# Patient Record
Sex: MALE | Race: WHITE | Employment: FULL TIME | URBAN - METROPOLITAN AREA
[De-identification: names, ages, dates, MRNs, and addresses within clinical notes are randomized per-mention and may not be internally consistent; named-entity substitution may affect disease eponyms.]

---

## 2017-01-13 ENCOUNTER — OFFICE VISIT (OUTPATIENT)
Dept: ORTHOPEDIC SURGERY | Age: 62
End: 2017-01-13

## 2017-01-13 VITALS
WEIGHT: 260 LBS | BODY MASS INDEX: 37.22 KG/M2 | HEIGHT: 70 IN | DIASTOLIC BLOOD PRESSURE: 90 MMHG | SYSTOLIC BLOOD PRESSURE: 138 MMHG | HEART RATE: 68 BPM

## 2017-01-13 DIAGNOSIS — M21.061 ACQUIRED VALGUS DEFORMITY KNEE, RIGHT: ICD-10-CM

## 2017-01-13 DIAGNOSIS — M25.461 EFFUSION OF RIGHT KNEE: Primary | ICD-10-CM

## 2017-01-13 DIAGNOSIS — G89.29 CHRONIC PAIN OF RIGHT KNEE: ICD-10-CM

## 2017-01-13 DIAGNOSIS — M17.11 PRIMARY OSTEOARTHRITIS OF RIGHT KNEE: ICD-10-CM

## 2017-01-13 DIAGNOSIS — M17.0 PRIMARY OSTEOARTHRITIS OF BOTH KNEES: ICD-10-CM

## 2017-01-13 DIAGNOSIS — M25.561 CHRONIC PAIN OF RIGHT KNEE: ICD-10-CM

## 2017-01-13 PROCEDURE — 99213 OFFICE O/P EST LOW 20 MIN: CPT | Performed by: ORTHOPAEDIC SURGERY

## 2017-01-20 ENCOUNTER — HOSPITAL ENCOUNTER (OUTPATIENT)
Dept: PREADMISSION TESTING | Age: 62
Discharge: HOME | End: 2017-01-20
Attending: ORTHOPAEDIC SURGERY | Admitting: ORTHOPAEDIC SURGERY

## 2017-01-20 VITALS
HEART RATE: 62 BPM | RESPIRATION RATE: 18 BRPM | WEIGHT: 265 LBS | BODY MASS INDEX: 37.94 KG/M2 | HEIGHT: 70 IN | SYSTOLIC BLOOD PRESSURE: 136 MMHG | TEMPERATURE: 97 F | DIASTOLIC BLOOD PRESSURE: 88 MMHG | OXYGEN SATURATION: 99 %

## 2017-01-20 LAB
ABO/RH: NORMAL
ABO/RH: NORMAL
ANION GAP SERPL CALCULATED.3IONS-SCNC: 10 MMOL/L (ref 3–16)
ANTIBODY SCREEN: NORMAL
APTT: 33 SEC (ref 25.2–36.4)
BASOPHILS ABSOLUTE: 0 K/UL (ref 0–0.2)
BASOPHILS RELATIVE PERCENT: 0.5 %
BILIRUBIN URINE: NEGATIVE
BLOOD, URINE: NEGATIVE
BUN BLDV-MCNC: 23 MG/DL (ref 7–20)
CALCIUM SERPL-MCNC: 8.9 MG/DL (ref 8.3–10.6)
CHLORIDE BLD-SCNC: 101 MMOL/L (ref 99–110)
CLARITY: CLEAR
CO2: 27 MMOL/L (ref 21–32)
COLOR: YELLOW
CREAT SERPL-MCNC: 0.8 MG/DL (ref 0.8–1.3)
EOSINOPHILS ABSOLUTE: 0.2 K/UL (ref 0–0.6)
EOSINOPHILS RELATIVE PERCENT: 3.1 %
GFR AFRICAN AMERICAN: >60
GFR NON-AFRICAN AMERICAN: >60
GLUCOSE BLD-MCNC: 93 MG/DL (ref 70–99)
GLUCOSE URINE: NEGATIVE MG/DL
HCT VFR BLD CALC: 42.5 % (ref 40.5–52.5)
HEMOGLOBIN: 14.6 G/DL (ref 13.5–17.5)
INR BLD: 0.98 (ref 0.85–1.16)
KETONES, URINE: NEGATIVE MG/DL
LEUKOCYTE ESTERASE, URINE: NEGATIVE
LYMPHOCYTES ABSOLUTE: 1.6 K/UL (ref 1–5.1)
LYMPHOCYTES RELATIVE PERCENT: 27.5 %
MCH RBC QN AUTO: 30.2 PG (ref 26–34)
MCHC RBC AUTO-ENTMCNC: 34.2 G/DL (ref 31–36)
MCV RBC AUTO: 88.1 FL (ref 80–100)
MICROSCOPIC EXAMINATION: NORMAL
MONOCYTES ABSOLUTE: 0.5 K/UL (ref 0–1.3)
MONOCYTES RELATIVE PERCENT: 8.7 %
NEUTROPHILS ABSOLUTE: 3.6 K/UL (ref 1.7–7.7)
NEUTROPHILS RELATIVE PERCENT: 60.2 %
NITRITE, URINE: NEGATIVE
PDW BLD-RTO: 13.1 % (ref 12.4–15.4)
PH UA: 6
PLATELET # BLD: 209 K/UL (ref 135–450)
PMV BLD AUTO: 8.1 FL (ref 5–10.5)
POTASSIUM SERPL-SCNC: 4.3 MMOL/L (ref 3.5–5.1)
PROTEIN UA: NEGATIVE MG/DL
PROTHROMBIN TIME: 11.2 SEC (ref 9.8–13)
RBC # BLD: 4.83 M/UL (ref 4.2–5.9)
SODIUM BLD-SCNC: 138 MMOL/L (ref 136–145)
SPECIFIC GRAVITY UA: 1.01
URINE TYPE: NORMAL
UROBILINOGEN, URINE: 0.2 E.U./DL
WBC # BLD: 5.9 K/UL (ref 4–11)

## 2017-01-20 RX ORDER — LISINOPRIL 30 MG/1
30 TABLET ORAL DAILY
COMMUNITY

## 2017-01-20 RX ORDER — TRANEXAMIC ACID 650 1/1
1950 TABLET ORAL ONCE
Status: CANCELLED | OUTPATIENT
Start: 2017-01-20 | End: 2017-01-20

## 2017-01-20 RX ORDER — GABAPENTIN 300 MG/1
300 CAPSULE ORAL
Status: CANCELLED | OUTPATIENT
Start: 2017-01-20 | End: 2017-01-20

## 2017-01-21 LAB
MRSA SCREEN RT-PCR: NORMAL
URINE CULTURE, ROUTINE: NORMAL

## 2017-01-24 ENCOUNTER — TELEPHONE (OUTPATIENT)
Dept: ORTHOPEDIC SURGERY | Age: 62
End: 2017-01-24

## 2017-01-30 ENCOUNTER — OFFICE VISIT (OUTPATIENT)
Dept: ORTHOPEDIC SURGERY | Age: 62
End: 2017-01-30

## 2017-01-30 VITALS
BODY MASS INDEX: 36.51 KG/M2 | SYSTOLIC BLOOD PRESSURE: 124 MMHG | DIASTOLIC BLOOD PRESSURE: 78 MMHG | HEIGHT: 70 IN | HEART RATE: 102 BPM | WEIGHT: 255 LBS

## 2017-01-30 DIAGNOSIS — Z96.651 STATUS POST TOTAL RIGHT KNEE REPLACEMENT: Primary | ICD-10-CM

## 2017-01-30 PROCEDURE — 99024 POSTOP FOLLOW-UP VISIT: CPT | Performed by: ORTHOPAEDIC SURGERY

## 2017-01-30 RX ORDER — OXYCODONE HYDROCHLORIDE AND ACETAMINOPHEN 5; 325 MG/1; MG/1
1-2 TABLET ORAL 4 TIMES DAILY PRN
Qty: 60 TABLET | Refills: 0 | Status: SHIPPED | OUTPATIENT
Start: 2017-01-30 | End: 2017-03-27

## 2017-02-13 ENCOUNTER — TELEPHONE (OUTPATIENT)
Dept: ORTHOPEDIC SURGERY | Age: 62
End: 2017-02-13

## 2017-02-14 RX ORDER — HYDROCODONE BITARTRATE AND ACETAMINOPHEN 7.5; 325 MG/1; MG/1
1 TABLET ORAL 4 TIMES DAILY PRN
Qty: 60 TABLET | Refills: 0 | Status: SHIPPED | OUTPATIENT
Start: 2017-02-14 | End: 2017-03-27

## 2017-02-20 ENCOUNTER — OFFICE VISIT (OUTPATIENT)
Dept: ORTHOPEDIC SURGERY | Age: 62
End: 2017-02-20

## 2017-02-20 VITALS
DIASTOLIC BLOOD PRESSURE: 89 MMHG | HEART RATE: 70 BPM | BODY MASS INDEX: 36.51 KG/M2 | HEIGHT: 70 IN | WEIGHT: 255 LBS | SYSTOLIC BLOOD PRESSURE: 131 MMHG

## 2017-02-20 DIAGNOSIS — M17.11 PRIMARY OSTEOARTHRITIS OF RIGHT KNEE: Primary | ICD-10-CM

## 2017-02-20 DIAGNOSIS — Z96.651 STATUS POST TOTAL RIGHT KNEE REPLACEMENT: ICD-10-CM

## 2017-02-20 DIAGNOSIS — M21.061 ACQUIRED VALGUS DEFORMITY KNEE, RIGHT: ICD-10-CM

## 2017-02-20 DIAGNOSIS — M17.0 PRIMARY OSTEOARTHRITIS OF BOTH KNEES: Chronic | ICD-10-CM

## 2017-02-20 PROCEDURE — 99024 POSTOP FOLLOW-UP VISIT: CPT | Performed by: ORTHOPAEDIC SURGERY

## 2017-02-20 RX ORDER — OXYCODONE HYDROCHLORIDE AND ACETAMINOPHEN 5; 325 MG/1; MG/1
1 TABLET ORAL 4 TIMES DAILY PRN
Qty: 60 TABLET | Refills: 0 | Status: SHIPPED | OUTPATIENT
Start: 2017-02-20 | End: 2017-03-27

## 2017-02-20 RX ORDER — DICLOFENAC SODIUM 75 MG/1
75 TABLET, DELAYED RELEASE ORAL 2 TIMES DAILY
Qty: 60 TABLET | Refills: 3 | Status: SHIPPED | OUTPATIENT
Start: 2017-02-20 | End: 2017-07-03

## 2017-03-27 ENCOUNTER — OFFICE VISIT (OUTPATIENT)
Dept: ORTHOPEDIC SURGERY | Age: 62
End: 2017-03-27

## 2017-03-27 VITALS
HEIGHT: 70 IN | SYSTOLIC BLOOD PRESSURE: 158 MMHG | HEART RATE: 56 BPM | DIASTOLIC BLOOD PRESSURE: 102 MMHG | WEIGHT: 255 LBS | BODY MASS INDEX: 36.51 KG/M2

## 2017-03-27 DIAGNOSIS — Z96.651 STATUS POST TOTAL RIGHT KNEE REPLACEMENT: Primary | ICD-10-CM

## 2017-03-27 DIAGNOSIS — M25.461 EFFUSION OF RIGHT KNEE: ICD-10-CM

## 2017-03-27 PROCEDURE — 99024 POSTOP FOLLOW-UP VISIT: CPT | Performed by: ORTHOPAEDIC SURGERY

## 2017-03-27 PROCEDURE — 73564 X-RAY EXAM KNEE 4 OR MORE: CPT | Performed by: ORTHOPAEDIC SURGERY

## 2017-03-27 RX ORDER — OXYCODONE HYDROCHLORIDE AND ACETAMINOPHEN 5; 325 MG/1; MG/1
1 TABLET ORAL 2 TIMES DAILY PRN
Qty: 60 TABLET | Refills: 0 | Status: SHIPPED | OUTPATIENT
Start: 2017-03-27 | End: 2017-07-03

## 2017-03-27 RX ORDER — IBUPROFEN 800 MG/1
800 TABLET ORAL 3 TIMES DAILY PRN
Qty: 90 TABLET | Refills: 3 | Status: ON HOLD | OUTPATIENT
Start: 2017-03-27 | End: 2017-11-08

## 2017-05-17 ENCOUNTER — OFFICE VISIT (OUTPATIENT)
Dept: ORTHOPEDIC SURGERY | Age: 62
End: 2017-05-17

## 2017-05-17 VITALS
BODY MASS INDEX: 36.51 KG/M2 | WEIGHT: 255 LBS | DIASTOLIC BLOOD PRESSURE: 80 MMHG | HEART RATE: 64 BPM | SYSTOLIC BLOOD PRESSURE: 118 MMHG | HEIGHT: 70 IN

## 2017-05-17 DIAGNOSIS — M21.162 ACQUIRED VARUS DEFORMITY KNEE, LEFT: ICD-10-CM

## 2017-05-17 DIAGNOSIS — Z96.651 STATUS POST TOTAL RIGHT KNEE REPLACEMENT: ICD-10-CM

## 2017-05-17 PROBLEM — M21.169 ACQUIRED VARUS DEFORMITY KNEE: Status: ACTIVE | Noted: 2017-05-17

## 2017-05-17 PROCEDURE — 99213 OFFICE O/P EST LOW 20 MIN: CPT | Performed by: ORTHOPAEDIC SURGERY

## 2017-07-03 ENCOUNTER — OFFICE VISIT (OUTPATIENT)
Dept: ORTHOPEDIC SURGERY | Age: 62
End: 2017-07-03

## 2017-07-03 VITALS
HEART RATE: 70 BPM | SYSTOLIC BLOOD PRESSURE: 143 MMHG | BODY MASS INDEX: 36.51 KG/M2 | WEIGHT: 255 LBS | HEIGHT: 70 IN | DIASTOLIC BLOOD PRESSURE: 100 MMHG

## 2017-07-03 DIAGNOSIS — Z96.651 STATUS POST TOTAL RIGHT KNEE REPLACEMENT: Primary | ICD-10-CM

## 2017-07-03 DIAGNOSIS — M17.12 PRIMARY OSTEOARTHRITIS OF LEFT KNEE: ICD-10-CM

## 2017-07-03 DIAGNOSIS — M21.062 ACQUIRED VALGUS DEFORMITY KNEE, LEFT: ICD-10-CM

## 2017-07-03 PROBLEM — M21.069 ACQUIRED VALGUS DEFORMITY KNEE: Status: ACTIVE | Noted: 2017-07-03

## 2017-07-03 PROCEDURE — 99213 OFFICE O/P EST LOW 20 MIN: CPT | Performed by: ORTHOPAEDIC SURGERY

## 2017-07-03 PROCEDURE — 20610 DRAIN/INJ JOINT/BURSA W/O US: CPT | Performed by: ORTHOPAEDIC SURGERY

## 2017-07-28 ENCOUNTER — HOSPITAL ENCOUNTER (OUTPATIENT)
Dept: GENERAL RADIOLOGY | Facility: MEDICAL CENTER | Age: 62
Discharge: HOME | End: 2017-07-28
Attending: ORTHOPAEDIC SURGERY | Admitting: ORTHOPAEDIC SURGERY

## 2017-07-28 ENCOUNTER — TELEPHONE (OUTPATIENT)
Dept: ORTHOPEDIC SURGERY | Age: 62
End: 2017-07-28

## 2017-07-28 ENCOUNTER — OFFICE VISIT (OUTPATIENT)
Dept: ORTHOPEDIC SURGERY | Age: 62
End: 2017-07-28

## 2017-07-28 ENCOUNTER — SURG/PROC ORDERS (OUTPATIENT)
Dept: ORTHOPEDIC SURGERY | Age: 62
End: 2017-07-28

## 2017-07-28 VITALS
WEIGHT: 250 LBS | BODY MASS INDEX: 35 KG/M2 | SYSTOLIC BLOOD PRESSURE: 139 MMHG | HEIGHT: 71 IN | DIASTOLIC BLOOD PRESSURE: 92 MMHG | HEART RATE: 56 BPM

## 2017-07-28 DIAGNOSIS — Z96.651 STATUS POST TOTAL RIGHT KNEE REPLACEMENT: ICD-10-CM

## 2017-07-28 DIAGNOSIS — Z98.890 S/P LEFT KNEE ARTHROSCOPY: ICD-10-CM

## 2017-07-28 DIAGNOSIS — G89.29 CHRONIC PAIN OF LEFT KNEE: ICD-10-CM

## 2017-07-28 DIAGNOSIS — M25.562 CHRONIC PAIN OF LEFT KNEE: ICD-10-CM

## 2017-07-28 DIAGNOSIS — G89.29 CHRONIC PAIN OF LEFT KNEE: Primary | ICD-10-CM

## 2017-07-28 DIAGNOSIS — M17.12 PRIMARY OSTEOARTHRITIS OF LEFT KNEE: ICD-10-CM

## 2017-07-28 DIAGNOSIS — M25.562 CHRONIC PAIN OF LEFT KNEE: Primary | ICD-10-CM

## 2017-07-28 PROCEDURE — 73562 X-RAY EXAM OF KNEE 3: CPT | Performed by: ORTHOPAEDIC SURGERY

## 2017-07-28 PROCEDURE — 99214 OFFICE O/P EST MOD 30 MIN: CPT | Performed by: ORTHOPAEDIC SURGERY

## 2017-10-05 ENCOUNTER — TELEPHONE (OUTPATIENT)
Dept: ORTHOPEDIC SURGERY | Age: 62
End: 2017-10-05

## 2017-10-30 ENCOUNTER — HOSPITAL ENCOUNTER (OUTPATIENT)
Dept: PREADMISSION TESTING | Age: 62
Discharge: HOME | End: 2017-10-30
Attending: ORTHOPAEDIC SURGERY | Admitting: ORTHOPAEDIC SURGERY

## 2017-10-30 ENCOUNTER — TELEPHONE (OUTPATIENT)
Dept: ORTHOPEDIC SURGERY | Age: 62
End: 2017-10-30

## 2017-10-30 VITALS
SYSTOLIC BLOOD PRESSURE: 131 MMHG | BODY MASS INDEX: 37.37 KG/M2 | OXYGEN SATURATION: 98 % | DIASTOLIC BLOOD PRESSURE: 82 MMHG | HEIGHT: 70 IN | TEMPERATURE: 96.6 F | RESPIRATION RATE: 14 BRPM | HEART RATE: 81 BPM | WEIGHT: 261 LBS

## 2017-10-30 LAB
A/G RATIO: 1.7 (ref 1.1–2.2)
ABO/RH: NORMAL
ALBUMIN SERPL-MCNC: 4.3 G/DL (ref 3.4–5)
ALP BLD-CCNC: 81 U/L (ref 40–129)
ALT SERPL-CCNC: 24 U/L (ref 10–40)
ANION GAP SERPL CALCULATED.3IONS-SCNC: 10 MMOL/L (ref 3–16)
ANTIBODY SCREEN: NORMAL
APTT: 32 SEC (ref 24.1–34.9)
AST SERPL-CCNC: 24 U/L (ref 15–37)
BASOPHILS ABSOLUTE: 0 K/UL (ref 0–0.2)
BASOPHILS RELATIVE PERCENT: 0.6 %
BILIRUB SERPL-MCNC: 1.3 MG/DL (ref 0–1)
BILIRUBIN URINE: NEGATIVE
BLOOD, URINE: NEGATIVE
BUN BLDV-MCNC: 20 MG/DL (ref 7–20)
CALCIUM SERPL-MCNC: 9.1 MG/DL (ref 8.3–10.6)
CHLORIDE BLD-SCNC: 104 MMOL/L (ref 99–110)
CLARITY: CLEAR
CO2: 26 MMOL/L (ref 21–32)
COLOR: YELLOW
CREAT SERPL-MCNC: 0.8 MG/DL (ref 0.8–1.3)
EKG ATRIAL RATE: 57 BPM
EKG DIAGNOSIS: NORMAL
EKG P AXIS: 57 DEGREES
EKG P-R INTERVAL: 158 MS
EKG Q-T INTERVAL: 428 MS
EKG QRS DURATION: 102 MS
EKG QTC CALCULATION (BAZETT): 416 MS
EKG R AXIS: 0 DEGREES
EKG T AXIS: 16 DEGREES
EKG VENTRICULAR RATE: 57 BPM
EOSINOPHILS ABSOLUTE: 0.1 K/UL (ref 0–0.6)
EOSINOPHILS RELATIVE PERCENT: 2 %
GFR AFRICAN AMERICAN: >60
GFR NON-AFRICAN AMERICAN: >60
GLOBULIN: 2.6 G/DL
GLUCOSE BLD-MCNC: 103 MG/DL (ref 70–99)
GLUCOSE URINE: NEGATIVE MG/DL
HCT VFR BLD CALC: 43.3 % (ref 40.5–52.5)
HEMOGLOBIN: 15 G/DL (ref 13.5–17.5)
INR BLD: 1.02 (ref 0.85–1.15)
KETONES, URINE: NEGATIVE MG/DL
LEUKOCYTE ESTERASE, URINE: NEGATIVE
LYMPHOCYTES ABSOLUTE: 1.7 K/UL (ref 1–5.1)
LYMPHOCYTES RELATIVE PERCENT: 25.2 %
MCH RBC QN AUTO: 30.5 PG (ref 26–34)
MCHC RBC AUTO-ENTMCNC: 34.6 G/DL (ref 31–36)
MCV RBC AUTO: 88.3 FL (ref 80–100)
MICROSCOPIC EXAMINATION: NORMAL
MONOCYTES ABSOLUTE: 0.6 K/UL (ref 0–1.3)
MONOCYTES RELATIVE PERCENT: 8.3 %
NEUTROPHILS ABSOLUTE: 4.3 K/UL (ref 1.7–7.7)
NEUTROPHILS RELATIVE PERCENT: 63.9 %
NITRITE, URINE: NEGATIVE
PDW BLD-RTO: 13.3 % (ref 12.4–15.4)
PH UA: 6.5
PLATELET # BLD: 217 K/UL (ref 135–450)
PMV BLD AUTO: 8.4 FL (ref 5–10.5)
POTASSIUM SERPL-SCNC: 4.3 MMOL/L (ref 3.5–5.1)
PROTEIN UA: NEGATIVE MG/DL
PROTHROMBIN TIME: 11.5 SEC (ref 9.6–13)
RBC # BLD: 4.91 M/UL (ref 4.2–5.9)
SODIUM BLD-SCNC: 140 MMOL/L (ref 136–145)
SPECIFIC GRAVITY UA: 1.02
TOTAL PROTEIN: 6.9 G/DL (ref 6.4–8.2)
URINE TYPE: NORMAL
UROBILINOGEN, URINE: 1 E.U./DL
WBC # BLD: 6.7 K/UL (ref 4–11)

## 2017-10-30 PROCEDURE — 93010 ELECTROCARDIOGRAM REPORT: CPT | Performed by: INTERNAL MEDICINE

## 2017-10-30 NOTE — PRE-PROCEDURE INSTRUCTIONS
[facility]                          Date of Procedure 11/8 Time of Procedure  0830    PRIOR TO PROCEDURE DATE:  1. Please follow any guidelines/instructions prior to your procedure as advised by your surgeon. 2. Arrange for someone to drive you home and be with you for the first 24 hours after discharge for your safety after your procedure for which you received sedation. Ensure it is someone we can share information with regarding your discharge. 3. You must contact your surgeon for instructions IF:   You are taking any blood thinners, aspirin, anti-inflammatory or vitamin E.   There is a change in your physical condition such as a cold, fever, rash, cuts, sores or any other infection, especially near your surgical site. 4. Do not drink alcohol the day before or day of your procedure. 5. A Pre-op History and Physical for surgery MUST be completed by your Physician or Urgent Care within 30 days of your procedure date. Please bring a copy with you on the day of your procedure and along with any other testing performed. THE DAY OF YOUR PROCEDURE:  1. Follow instructions for ARRIVAL TIME as DIRECTED BY YOUR SURGEON. If your surgeon does not give you a specific arrival time, please arrive at  0630.    2. Enter the MAIN entrance from [address] [place] [address] and follow the signs to the free [employer] or [employer] parking (offered free of charge 6am-5pm). 3. Enter the Main Entrance of the hospital (do NOT enter from the lower level of the parking garage). Upon entrance, check in with the  at the main desk on your left. If no one is available at the desk, proceed into the [facility] Waiting Room and go through the door directly into the [facility]. There is a Check-in desk ACROSS from Room 5 (marked with a sign hanging from the ceiling).  The phone number for the surgery center is [phone].    4. DO NOT EAT OR DRINK ANYTHING AFTER MIDNIGHT anesthesia. Should any of these symptoms become severe, or should you notice any signs of infection, you should call your surgeon. 5. Narcotic pain medications can cause significant constipation. You may want to add a stool softener to your postoperative medication schedule or speak to your surgeon on how best to manage this SIDE EFFECT. SPECIAL INSTRUCTIONS     Thank you for allowing us to care for you. We strive to exceed your expectations in the overall delivery of care and service provided to you and your family. If you need to contact us for any reason, please call us at [phone]    Instructions reviewed and copy given to patient during preadmission testing visit. Vernell Corley. 10/30/2017 .9:16 AM      ADDITIONAL EDUCATIONAL INFORMATION REVIEWED / PROVIDED TO YOU AND YOUR FAMILY:  Yes Taking Control of Your Pain   Yes FAQs about Surgical Site Infections    No Your Guide to Hip Replacement Surgery. PLEASE BRING THIS BOOKLET BACK ON THE DAY OF YOUR SURGERY. Yes Your Guide to Knee Replacement Surgery. PLEASE BRING THIS BOOKLET BACK ON THE DAY OF YOUR SURGERY. No Your Guide to Shoulder Replacement Surgery. PLEASE BRING THIS BOOKLET BACK ON THE DAY OF YOUR SURGERY.     Yes Hibiclens® Bathing Instructions   Yes Incentive Spirometer given to patient- PLEASE BRING THIS SPIROMETER BACK WITH YOU ON THE DAY OF YOUR SURGERY  No CMS Comprehensive Care for Joint Replacement Model Notification Letter  No Other

## 2017-10-31 LAB
MRSA SCREEN RT-PCR: NORMAL
URINE CULTURE, ROUTINE: NORMAL

## 2017-11-08 PROBLEM — Z96.652 S/P TKR (TOTAL KNEE REPLACEMENT) USING CEMENT, LEFT: Status: ACTIVE | Noted: 2017-11-08

## 2017-11-08 PROBLEM — I10 HYPERTENSION: Status: ACTIVE | Noted: 2017-11-08

## 2017-11-13 ENCOUNTER — TELEPHONE (OUTPATIENT)
Dept: ORTHOPEDIC SURGERY | Age: 62
End: 2017-11-13

## 2017-11-13 RX ORDER — OXYCODONE HYDROCHLORIDE AND ACETAMINOPHEN 5; 325 MG/1; MG/1
1 TABLET ORAL EVERY 4 HOURS PRN
Qty: 30 TABLET | Refills: 0 | Status: SHIPPED | OUTPATIENT
Start: 2017-11-13 | End: 2017-11-20 | Stop reason: SDUPTHER

## 2017-11-13 NOTE — TELEPHONE ENCOUNTER
I spoke with the patient letting him know that the script is ready to be picked up at the [address] [place] [address] location. He stated that his wife will probably pick it up tomorrow.

## 2017-11-20 ENCOUNTER — OFFICE VISIT (OUTPATIENT)
Dept: ORTHOPEDIC SURGERY | Age: 62
End: 2017-11-20

## 2017-11-20 ENCOUNTER — HOSPITAL ENCOUNTER (OUTPATIENT)
Dept: OTHER | Age: 62
Discharge: HOME | End: 2017-11-20
Attending: ORTHOPAEDIC SURGERY | Admitting: ORTHOPAEDIC SURGERY

## 2017-11-20 VITALS
DIASTOLIC BLOOD PRESSURE: 81 MMHG | BODY MASS INDEX: 37.37 KG/M2 | SYSTOLIC BLOOD PRESSURE: 126 MMHG | HEART RATE: 75 BPM | HEIGHT: 70 IN | WEIGHT: 261.02 LBS

## 2017-11-20 DIAGNOSIS — M25.562 CHRONIC PAIN OF LEFT KNEE: ICD-10-CM

## 2017-11-20 DIAGNOSIS — G89.29 CHRONIC PAIN OF LEFT KNEE: ICD-10-CM

## 2017-11-20 DIAGNOSIS — Z96.652 S/P TKR (TOTAL KNEE REPLACEMENT) USING CEMENT, LEFT: Primary | ICD-10-CM

## 2017-11-20 PROBLEM — M17.12 PRIMARY OSTEOARTHRITIS OF LEFT KNEE: Status: RESOLVED | Noted: 2017-07-03 | Resolved: 2017-11-20

## 2017-11-20 PROCEDURE — 99024 POSTOP FOLLOW-UP VISIT: CPT | Performed by: PHYSICIAN ASSISTANT

## 2017-11-20 PROCEDURE — 73562 X-RAY EXAM OF KNEE 3: CPT | Performed by: PHYSICIAN ASSISTANT

## 2017-11-20 RX ORDER — OXYCODONE HYDROCHLORIDE AND ACETAMINOPHEN 5; 325 MG/1; MG/1
1 TABLET ORAL EVERY 4 HOURS PRN
Qty: 30 TABLET | Refills: 0 | Status: SHIPPED | OUTPATIENT
Start: 2017-11-20 | End: 2017-11-22 | Stop reason: SDUPTHER

## 2017-11-20 NOTE — PROGRESS NOTES
Assessment  Location of Pain: Knee  Location Modifiers: Left  Severity of Pain: 8  Quality of Pain: Sharp, Aching  Duration of Pain: A few hours  Frequency of Pain: Several times daily  Date Pain First Started:  (from sx)  Aggravating Factors: Bending, Stretching, Straightening, Other (Comment) (all)  Limiting Behavior: Yes  Relieving Factors: Ice, Rest  Result of Injury: No  Work-Related Injury: No  Are there other pain locations you wish to document?: No    Patient Active Problem List   Diagnosis    Status post total right knee replacement 1/24/2017    S/P left knee arthroscopy 40 years ago    Chronic pain of left knee    S/P TKR (total knee replacement) using cement, left on 11/8/2017    Hypertension     Past Medical History:   Diagnosis Date    Anesthesia     difficulty urinating after back surgery     Arthritis     Chronic back pain     relieved with surgery in 2015    Hypertension     Hypertension 11/8/2017    Pinched nerve     L2-L5    Retention of urine      Past Surgical History:   Procedure Laterality Date    BACK SURGERY      COLONOSCOPY      KNEE SURGERY  x 3    OTHER SURGICAL HISTORY Left 11/08/2017    LEFT TOTAL KNEE ARTHROPLASTY              SHOULDER SURGERY      TOTAL KNEE ARTHROPLASTY Right 01/24/2017    RIGHT TOTAL KNEE ARTHROPLASTY                Allergies:  Penicillins; Bee venom; and Sulfa antibiotics    Medications:  Prior to Visit Medications    Medication Sig Taking? Authorizing Provider   oxyCODONE-acetaminophen (PERCOCET) 5-325 MG per tablet Take 1 tablet by mouth every 4 hours as needed for Pain .  Earliest Fill Date: 11/20/17 Yes JUDI Elena   aspirin 325 MG EC tablet Take 1 tablet by mouth 2 times daily (with meals)  JUDI Elena   lisinopril (PRINIVIL;ZESTRIL) 30 MG tablet Take 30 mg by mouth daily  Historical Provider, MD   tamsulosin (FLOMAX) 0.4 MG capsule Take 0.4 mg by mouth daily 30 minutes after meal   Historical Provider, MD     Social History without abrasion or lacerations. [x] Incisions with no signs of infection (red, warmth, drainage) and healing well  [] Incisions well healed  [x] Ecchymosis:  [x] none  [] mild  [] moderate  [] severe  [x] Atrophy:  [x] none  [] mild  [] moderate  [] severe  [x] Effusion: [x] none  [] mild  [] moderate  [] severe  [x] Scar / Surgical incision(s): [] A-Scope Portals  [x] Open Surgical Incision(s)    Alignment:  [x] Normal  [] Varus [] Valgus    Range of Motion:  [] Normal Knee ROM         [] Deferred: acute injury/post-surgery/pain   [x] Limited ROM: Normal post-op  [x] secondary to normal post-op pain/limitations    Palpation:   [] No Tenderness  [x] Tenderness: Normal post-op [x] mild  [] moderate  [] severe [x] secondary to normal post-op pain/limitations  [x] Baker's Cyst:  [x] none  [] small  [] medium  [] large  [x] Kedar's Sign: [x] negative  [] positive    Motor Function:   [] No gross motor weakness  [x] Motor weakness: Normal post-op [x] mild  [] moderate  [] severe   [x] secondary to normal post-op pain/limitations    Neurologic:  [x] Sensation to light touch intact   [x] Coordination / proprioception intact    Circulation:  [x] The limb is warm and well perfused  [x] Capillary refill is intact. [x] Edema  [x] none  [] mild  [] moderate  [] severe  [x] Venous stasis changes  [x] none  [] mild  [] moderate  [] severe    Data Reviewed:     XRays:  (3 views: AP, lateral and patella views) of his left knee taken today 11/20/17 in the office and reviewed by me personally showed a satisfactory TKR with no evidence of acute fracture or loosening. [x]  A copy of the AP XRay was given to him and reviewed with him today. Assessment (Medical Decision Making): Aftercare following joint replacement surgery:   (ICD10 code: Z47.1)     [x]  Left TKR (Total knee replacement) (ICD10 code: L68.037)     Linda Salcedo is a 65y.o. year old male with the following diagnosis:    1.  S/P TKR (total knee replacement) using cement, left on 11/8/2017     2. Chronic pain of left knee  XR KNEE LEFT (3 VIEWS)       His overall course:       [x]  Responding adequately to ongoing treatment after surgery    []  Worsening despite postoperative treatment     []  Unchanged despite postoperative treatment     Plan (Medical Decision Making):       [x] Continue deep venous thrombosis prophylaxis:    HIRA Parker    Aspirin 325 mg PO BID x 6 weeks post-op. Once he has finished the aspirin he may restart his ibuprofen. [x] Continue physical therapy   [x] Continue pain control: Ice and Percocet   [x] Ice to knee. [x] The staples were removed today. Steri-strips were applied. [x] Gutierrez Carrier has been instructed to remove the steri-strips in two days then he can start to rub Vit E oil on the incision(s) once a day. Refills/scripts given today: Percocet                             [x]  OARRS ([place] Automated Rx Reporting System):                                      OARRS Report on Gutierrez Carranza was run: 7/28/17. Active Cumulative Morphine Equivalent of 0           Patient being given increased dosage/quantity of opioid pain medication in excess of OSMB guidelines which noted a 30 MED of opioids due to the fact that he has undergone major orthopaedic surgery as outlined in rule 4731-11-13. Dosages and further duration of the pain medication will be re-evaluated at his post op visit. Patient was educated on dosing expectations and limits of prescribing as a result of the new [facility] Board rules enacted August 31, 2017. Please also note that this is not the initial opioid prescription issued to this patient but a continuation of medication utilized during the hospital admission as noted in the medical record. OARRS report has also been utilized to screen for any abuse history or suspicious activity as outlined in [place].   All efforts have been taken to prevent abuse potential and misuse of opioid medications. Return to Clinic/Follow - Up:  Ady Ann was asked to make a follow-up appointment:    [x] 4 weeks    Ady Ann was instructed to call the office if his symptoms worsen or if new symptoms appear prior to the next scheduled visit. He is specifically instructed to contact the office between now & his scheduled appointment if he has concerns related to his condition. He is welcome to call for an appointment sooner if he has any additional concerns or questions. Patient Instructions     PATIENT INSTRUCTIONS  For Ady Ann  Office Visit with Kristin Nolasco PA-C on 11/20/2017    Activity & PT Instructions:   Knee immobilizer and HIRA hose to be used until instructed by MD/PT   Avoid painful activities   No sports until cleared by MD/PT   No driving if you are taking narcotic pain medications or muscle relaxers   Keep extremity elevated   PT or Home Exercise Program for 3 months post-op    Note: PT is usually twice a week for 3 months (will be extended if necessary)   \"Remember to: [employer] and Tremor Video" your knee as instructed by Dr Marylene Chalk    Weight Bearing:   Use a walker until instructed by MD/PT   Use crutches or a walker if your knee is painful   May be partial weightbearing on operative leg   Advance to full weightbearing as tolerated once cleared by MD/PT    Wound Instructions:   Once sutures/staples are removed, use Vit E oil on incisions once a day   If in the sun, protect incisions from sun by using:     Sunscreen 30-50 SPF, reapply regularly. Elevate operative limb above heart level   Ice to knee for 15  20 minutes 3 x day    (\"ICE IS YOUR FRIEND\": try using a bag of frozen peas or corn)      Call the office ([phone], Option 3) if:     The incision becomes red, swollen or develops drainage. You develop a fever greater than 101 degrees.     Follow-up Care:   I have asked Ady Ann to schedule a (intra-muscularly or intravenously) 1 hour before the procedure. If you ARE allergic to penicillin[de-identified] 600 milligrams of clindamycin (orally or intravenously) 1 hour before the procedure. PLEASE NOTE: Antibiotics may vary based on clinical situation and culture results. PATIENT REMINDER:   Carry a list of your medications and allergies with you at all times. Call your pharmacy and our office at least 1 week in advance to refill prescriptions. Narcotic medications will not be refilled after hours or on weekends. ATTENTION    As of October 2, 2014, the [facility] ([facility]) has mandated that ALL PRESCRIPTION PAIN MEDICINE cannot be called into your pharmacy. This includes:    Oxycodone (Percocet)  Hydrocodone (Vicodin, Norco)  Tramadol (Ultram)  Other    These medications must have prescriptions which are written and signed by your provider. This means that you must call ahead and come in to the office to  the paper prescription and take it to your pharmacy. We are sorry for any inconvenience but this is now the law. Bar Buckley PA-C  Physician Assistant, Orthopaedic Surgery    Contact Information:  Gerald Pastrana,  & Clinical Staff  [phone], Option 3         IMPORTANT NARCOTIC INFORMATION: PLEASE READ     [x] DO NOT share your prescription medication with anyone! Sharing is illegal.  The prescription dose is based on your age, weight, and health problems. Sharing your narcotic prescription can lead to accidental death of the individual for which the prescription was not prescribed. You may not know about his/her addiction problem. [x] Always use the same pharmacy when filling your narcotic prescriptions. [x] DO NOT mix your narcotic prescription with alcohol.   Mixing the narcotic prescription medication with alcohol causes depressive effects including breathing problems, organ malfunction, and cardiac arrest.     [x] Always keep your narcotic medication in a locked, secured location. Keep your medication private. This is to avoid individuals from taking your medication without your knowledge. This medication is highly sought after and locking your prescriptions will protect you from being a target of crime. [x] DO NOT stock pile your medication. This also will protect you from being a target of crime. [x] Dispose of any unused medications properly. Do not flush the medications. Look for appropriate waste collection programs in your community and drug take back events. [x] DO NOT drive while taking narcotic pain medication or muscle relaxers. REMINDER:  The phases following a total knee replacement that most patients often encounter. Briefly:    First phase: First phase is The Pain Phase. There is plenty of pain medication (and pharmacy can be consulted if needed). TKR is a painful operation however pain management has improved significantly. Second phase: The second phase is Not Sleeping Phase. It is common for total knee replacement patients not to sleep well after total knee replacement. This can go on for several months. Third phase: The third phase is Depression. The \"not sleeping at night phase\" leads to the third phase in which patients often experience depression/the blues, feeling that \"this will never get any better\". It will get better. Fourth phase: The fourth phase is Swelling and Warmth. Around 4-6 weeks, patients will start to compare their total knee replacement knee with their other knee. They note that the total knee replacement knee is still a bit more swollen and a bit more warm compared to the other knee. This is normal.    Fifth phase: The fifth phase is \"my knee makes noises\" phase. It is common for patients to note that their total knee replacement makes noises.                          FALL PREVENTION:  SIMPLE TIPS    Vitamin D3:  Over-the-counter supplement of Vitamin D3, (at least 2,000 IU daily). Vitamin D3 is widely available without a prescription at pharmacies and buying clubs ([facility]) and on-line at sites such as Amazon.com. It comes in a variety of formulations (tablets, gelcaps, liquid) and doses (1,000 IU, 2,000 IU, 4,000 IU, 5,000 IU and even higher). The right dose for most people is 2,000 IU per day but higher doses are sometimes needed. You can take your vitamin D3 at any time of the day, with or without food, with or without calcium. Because vitamin D is long acting, if you miss your vitamin D on one day you can double up the dose on a later day. Exercise: Low impact exercise programs such as [employer]. Chair Raise Exercise. Yoga. Adult fitness classes at the  or community center. Physical Therapy: Formal physical therapy program to strengthen your lower extremities, improve your balance and gait. Home Assessment: Remove clutter and tripping hazards: loose/throw rugs, cords or cables. Install or placement of railings, grab bars around steps/stairs and in the bathroom (toilet, bath tub, sink). Improve lighting. Foot Wear:  Stable, well fitting. Avoid loose straps or ties, slippery soles. Vision/Eye Check Up: Have your vision checked yearly. Resources:  www.cdc.gov/injury/STEADI    1). STEADI: Stay Independent Self Risk Assessment    2). CDC Handouts:  How to prevent falls, Home Safety Fall Prevention         If you or someone you know struggles with weight issues and joint pain, please check out this important documentary:      \"Start Moving Start Living\" =  Http://startmovingst[employer]ving[employer]       (YOUTUBE video SMSL FULL SD)              Orders Placed This Encounter   Procedures    XR KNEE LEFT (3 VIEWS)     Standing Status:   Future     Number of Occurrences:   1     Standing Expiration Date:   11/20/2018     Order Specific Question:   Reason for exam:     Answer:   Pain       Refills/New Prescriptions:  Orders Placed This Encounter Medications    oxyCODONE-acetaminophen (PERCOCET) 5-325 MG per tablet     Sig: Take 1 tablet by mouth every 4 hours as needed for Pain . Earliest Fill Date: 11/20/17     Dispense:  30 tablet     Refill:  0     Today's prescription medications will be e-scribed (when appropriate) to the Patient's Preferred Pharmacy:   [address]  Phone: [phone] Fax: [phone]       Chip Zapata PA-C  Electronically signed by Chip Zapata PA-C on 11/20/2017 at 1:26 PM     Contact Information:  Rabia Clements, [facility], [facility] Staff  [phone], Option 3     This dictation was performed with a verbal recognition program (DRAGON) and it was checked for errors. It is possible that there are still dictated errors within this office note. If so, please bring any errors to my attention for an addendum. All efforts were made to ensure that this office note is accurate. I have personally performed and/or participated in the history, physical exam and medical decision making and reviewed all pertinent clinical information unless otherwise noted.

## 2017-11-20 NOTE — PATIENT INSTRUCTIONS
use as directed unless you request a printed prescription. If you have any concerns, questions or require refills, please contact our office ([phone], Option 3). If you experience any adverse reactions, stop the medication and call our office immediately. Aspirin Instructions: The aspirin (325 mg by mouth twice a day) Dr Norma Colon asked you to take is used to prevent blood clots. The length of time you will need to take aspirin is for 6 weeks post-op if you are not on any other type of blood thinner (plavix, coumadin, etc). Since aspirin does prevent clotting, you may be prone to excessive bleeding. If you experience signs and symptoms of bleeding and clotting problems, you must seek emergent care. You need to inform other health-care providers (ie family physicians, dentists, etc) that you are taking aspirin, especially if any procedures are planned. Call the office ([phone], Option 3) if you have any questions regarding your aspirin therapy. Antibiotics after Total Knee Replacements:     [x] Prophylactic antibiotics before routine dental cleaning are not required. [x] Other surgeries/procedures do require antibiotics (colonoscopy, abcesses/ active infections, etc.)    If you are NOT allergic to penicillin: 2 grams of amoxicillin, cephalexin or cephradine (orally) OR 2 grams of ampicillin or 1 gram of cefazolin (intra-muscularly or intravenously) 1 hour before the procedure. If you ARE allergic to penicillin[de-identified] 600 milligrams of clindamycin (orally or intravenously) 1 hour before the procedure. PLEASE NOTE: Antibiotics may vary based on clinical situation and culture results. PATIENT REMINDER:   Carry a list of your medications and allergies with you at all times. Call your pharmacy and our office at least 1 week in advance to refill prescriptions. Narcotic medications will not be refilled after hours or on weekends.          ATTENTION    As of October 2, 2014, the [facility]

## 2017-11-20 NOTE — LETTER
FAXED/SENT TO Dr Noelle Howard MD  11/20/17, 1:29 PM        Bar Buckley PA-C  Orthopaedic Surgery & Sports Medicine      Dear Dr Noelle Howard MD,    Thank you very much for your referral of Mr. Radha Garrison to me for evaluation and treatment. Attached below is my report and recommendations from Guido Kimble most recent office visit. I appreciate your confidence in me and thank you for allowing me the opportunity to care for your patients. If I can be of any further assistance to you on this or any other patient, please do not hesitate to contact me. Sincerely,    Bar Buckley PA-C  Electronically signed by Bar Buckley PA-C on 11/20/2017 at 1:29 PM     Contact Information:  Gerald Pastrana,  &  Clinical Staff  [phone], [address] [facility]  Orthopaedic Surgery & Sports Medicine        [address] [facility] OFFICE  [address], [address] [place] [address], [address] [place] [address]    [phone] Option 3   [phone] Option [phone] (fax)    [phone] (fax)       PATIENT: Radha Garrison    65 y.o.  male  [facility]: 1955   MRN:  [id number]       Date of current encounter: 11/20/2017  This encounter is evaluated as a:        New Patient Visit     Established Patient Visit    Post-Op Visit      Consult: requested by          Worker's Comp       Patient's PCP is Dr. Noelle Howard MD      SURGICAL FINDINGS: S/P TKR (total knee replacement) using cement, left on 11/8/2017           Subjective:     Chief Complaint   Patient presents with    Knee Pain     ongoing evaluation and treatment of left knee       HPI:  s/p Left TKR Surgery on 11/8/2017. Radha Garrison returns to the office today in follow-up of his left TKR.   He denies fever, wound drainage, increasing redness, pus, increasing Venous stasis changes   none   mild   moderate   severe    Data Reviewed:     XRays:  (3 views: AP, lateral and patella views) of his left knee taken today 11/20/17 in the office and reviewed by me personally showed a satisfactory TKR with no evidence of acute fracture or loosening. A copy of the AP XRay was given to him and reviewed with him today. Assessment (Medical Decision Making): Aftercare following joint replacement surgery:   (ICD10 code: Z47.1)       Left TKR (Total knee replacement) (ICD10 code: Z56.465)     Emili Bolton is a 65y.o. year old male with the following diagnosis:    1. S/P TKR (total knee replacement) using cement, left on 11/8/2017     2. Chronic pain of left knee  XR KNEE LEFT (3 VIEWS)       His overall course:         Responding adequately to ongoing treatment after surgery      Worsening despite postoperative treatment       Unchanged despite postoperative treatment     Plan (Medical Decision Making):        Continue deep venous thrombosis prophylaxis:    HIRA Parker    Aspirin 325 mg PO BID x 6 weeks post-op. Once he has finished the aspirin he may restart his ibuprofen. Continue physical therapy    Continue pain control: Ice and Percocet    Ice to knee. The staples were removed today. Steri-strips were applied. Emili Bolton has been instructed to remove the steri-strips in two days then he can start to rub Vit E oil on the incision(s) once a day. Refills/scripts given today: Percocet                               OARRS ([place] Automated Rx Reporting System):                                      OARRS Report on Emili Bolton was run: 7/28/17.                                       Active Cumulative Morphine Equivalent of 0           Patient being given increased dosage/quantity of opioid pain medication in excess of OSMB guidelines which noted a 30 MED of opioids due to the fact that he has undergone major orthopaedic surgery as outlined in rule 4731-11-13. Dosages and further duration of the pain medication will be re-evaluated at his post op visit. Patient was educated on dosing expectations and limits of prescribing as a result of the new [facility] Board rules enacted August 31, 2017. Please also note that this is not the initial opioid prescription issued to this patient but a continuation of medication utilized during the hospital admission as noted in the medical record. OARRS report has also been utilized to screen for any abuse history or suspicious activity as outlined in [place]. All efforts have been taken to prevent abuse potential and misuse of opioid medications. Return to Clinic/Follow - Up:  Puja Thornton was asked to make a follow-up appointment:     4 weeks    Puja Thornton was instructed to call the office if his symptoms worsen or if new symptoms appear prior to the next scheduled visit. He is specifically instructed to contact the office between now & his scheduled appointment if he has concerns related to his condition. He is welcome to call for an appointment sooner if he has any additional concerns or questions.           Patient Instructions     PATIENT INSTRUCTIONS  For Puja Thornton  Office Visit with Marifer Rivers PA-C on 11/20/2017    Activity & PT Instructions:   Knee immobilizer and HIRA hose to be used until instructed by MD/PT   Avoid painful activities   No sports until cleared by MD/PT   No driving if you are taking narcotic pain medications or muscle relaxers   Keep extremity elevated   PT or Home Exercise Program for 3 months post-op    Note: PT is usually twice a week for 3 months (will be extended if necessary)   \"Remember to: [employer] and [place]" your knee as instructed by Dr Ruth Villalta    Weight Bearing:   Use a walker until instructed by MD/PT   Use crutches or a walker if your knee is painful of bleeding and clotting problems, you must seek emergent care. You need to inform other health-care providers (ie family physicians, dentists, etc) that you are taking aspirin, especially if any procedures are planned. Call the office ([phone], Option 3) if you have any questions regarding your aspirin therapy. Antibiotics after Total Knee Replacements:      Prophylactic antibiotics before routine dental cleaning are not required. Other surgeries/procedures do require antibiotics (colonoscopy, abcesses/ active infections, etc.)    If you are NOT allergic to penicillin: 2 grams of amoxicillin, cephalexin or cephradine (orally) OR 2 grams of ampicillin or 1 gram of cefazolin (intra-muscularly or intravenously) 1 hour before the procedure. If you ARE allergic to penicillin[de-identified] 600 milligrams of clindamycin (orally or intravenously) 1 hour before the procedure. PLEASE NOTE: Antibiotics may vary based on clinical situation and culture results. PATIENT REMINDER:   Carry a list of your medications and allergies with you at all times. Call your pharmacy and our office at least 1 week in advance to refill prescriptions. Narcotic medications will not be refilled after hours or on weekends. ATTENTION    As of October 2, 2014, the [facility] ([address]) has mandated that ALL PRESCRIPTION PAIN MEDICINE cannot be called into your pharmacy. This includes:    Oxycodone (Percocet)  Hydrocodone (Vicodin, Norco)  Tramadol (Ultram)  Other    These medications must have prescriptions which are written and signed by your provider. This means that you must call ahead and come in to the office to  the paper prescription and take it to your pharmacy. We are sorry for any inconvenience but this is now the law.     Enrike Kessler PA-C  Physician Assistant, Orthopaedic Surgery    Contact Information:  Dorcas Abbott,  & Clinical Staff [phone], Option 3         IMPORTANT NARCOTIC INFORMATION: PLEASE READ      DO NOT share your prescription medication with anyone! Sharing is illegal.  The prescription dose is based on your age, weight, and health problems. Sharing your narcotic prescription can lead to accidental death of the individual for which the prescription was not prescribed. You may not know about his/her addiction problem. Always use the same pharmacy when filling your narcotic prescriptions. DO NOT mix your narcotic prescription with alcohol. Mixing the narcotic prescription medication with alcohol causes depressive effects including breathing problems, organ malfunction, and cardiac arrest.      Always keep your narcotic medication in a locked, secured location. Keep your medication private. This is to avoid individuals from taking your medication without your knowledge. This medication is highly sought after and locking your prescriptions will protect you from being a target of crime. DO NOT stock pile your medication. This also will protect you from being a target of crime. Dispose of any unused medications properly. Do not flush the medications. Look for appropriate waste collection programs in your community and drug take back events. DO NOT drive while taking narcotic pain medication or muscle relaxers. REMINDER:  The phases following a total knee replacement that most patients often encounter. Briefly:    First phase: First phase is The Pain Phase. There is plenty of pain medication (and pharmacy can be consulted if needed). TKR is a painful operation however pain management has improved significantly. Second phase: The second phase is Not Sleeping Phase. It is common for total knee replacement patients not to sleep well after total knee replacement. This can go on for several months. Third phase: The third phase is Depression.  The \"not sleeping at night phase\" leads to the third phase in which patients often experience depression/the blues, feeling that \"this will never get any better\". It will get better. Fourth phase: The fourth phase is Swelling and Warmth. Around 4-6 weeks, patients will start to compare their total knee replacement knee with their other knee. They note that the total knee replacement knee is still a bit more swollen and a bit more warm compared to the other knee. This is normal.    Fifth phase: The fifth phase is \"my knee makes noises\" phase. It is common for patients to note that their total knee replacement makes noises. FALL PREVENTION:  SIMPLE TIPS    Vitamin D3:  Over-the-counter supplement of Vitamin D3, (at least 2,000 IU daily). Vitamin D3 is widely available without a prescription at pharmacies and buying clubs ([facility], [facility]) and on-line at sites such as Amazon.com. It comes in a variety of formulations (tablets, gelcaps, liquid) and doses (1,000 IU, 2,000 IU, 4,000 IU, 5,000 IU and even higher). The right dose for most people is 2,000 IU per day but higher doses are sometimes needed. You can take your vitamin D3 at any time of the day, with or without food, with or without calcium. Because vitamin D is long acting, if you miss your vitamin D on one day you can double up the dose on a later day. Exercise: Low impact exercise programs such as [employer]. Chair Raise Exercise. Yoga. Adult fitness classes at the  or community center. Physical Therapy: Formal physical therapy program to strengthen your lower extremities, improve your balance and gait. Home Assessment: Remove clutter and tripping hazards: loose/throw rugs, cords or cables. Install or placement of railings, grab bars around steps/stairs and in the bathroom (toilet, bath tub, sink). Improve lighting. Foot Wear:  Stable, well fitting. Avoid loose straps or ties, slippery soles. Vision/Eye Check Up: Have your vision checked yearly. Resources:  www.cdc.gov/injury/STEADI    1). STEADI: Stay Independent Self Risk Assessment    2). CDC Handouts: How to prevent falls, Home Safety Fall Prevention         If you or someone you know struggles with weight issues and joint pain, please check out this important documentary:      \"Start Moving Start Living\" =  Http://[employer] (MemberRx).Rerecipe       (YOUTUBE video SMSL FULL SD)              Orders Placed This Encounter   Procedures    XR KNEE LEFT (3 VIEWS)     Standing Status:   Future     Number of Occurrences:   1     Standing Expiration Date:   11/20/2018     Order Specific Question:   Reason for exam:     Answer:   Pain       Refills/New Prescriptions:  Orders Placed This Encounter   Medications    oxyCODONE-acetaminophen (PERCOCET) 5-325 MG per tablet     Sig: Take 1 tablet by mouth every 4 hours as needed for Pain . Earliest Fill Date: 11/20/17     Dispense:  30 tablet     Refill:  0     Today's prescription medications will be e-scribed (when appropriate) to the Patient's Preferred Pharmacy:   [address]  Phone: [phone] Fax: [phone]       Lina Matthews PA-C  Electronically signed by Lina Matthews PA-C on 11/20/2017 at 1:26 PM     Contact Information:  Christian Key, [address] [place] [address] Staff  [phone], Option 3     This dictation was performed with a verbal recognition program (DRAGON) and it was checked for errors. It is possible that there are still dictated errors within this office note. If so, please bring any errors to my attention for an addendum. All efforts were made to ensure that this office note is accurate.      I have personally performed and/or participated in the history, physical exam and medical decision making and reviewed all pertinent clinical information unless otherwise noted.

## 2017-11-29 ENCOUNTER — TELEPHONE (OUTPATIENT)
Dept: ORTHOPEDIC SURGERY | Age: 62
End: 2017-11-29

## 2017-12-06 ENCOUNTER — TELEPHONE (OUTPATIENT)
Dept: ORTHOPEDIC SURGERY | Age: 62
End: 2017-12-06

## 2017-12-08 ENCOUNTER — TELEPHONE (OUTPATIENT)
Dept: ORTHOPEDIC SURGERY | Age: 62
End: 2017-12-08

## 2017-12-08 RX ORDER — OXYCODONE HYDROCHLORIDE AND ACETAMINOPHEN 5; 325 MG/1; MG/1
1 TABLET ORAL EVERY 4 HOURS PRN
Qty: 30 TABLET | Refills: 0 | Status: SHIPPED | OUTPATIENT
Start: 2017-12-08 | End: 2017-12-15 | Stop reason: SDUPTHER

## 2017-12-08 NOTE — TELEPHONE ENCOUNTER
Okay to fill. [x]  OARRS ([place] Automated Rx Reporting System):     OARRS Report on Linda Salcedo was run today: 12/8/17. Active Cumulative Morphine Equivalent was reviewed. Documentation was scanned into the chart. Patient being given increased dosage/quantity of opioid pain medication in excess of OSMB guidelines which noted a 30 MED of opioids due to the fact that he has undergone major orthopaedic surgery as outlined in rule 4731-11-13. Dosages and further duration of the pain medication will be re-evaluated at his post op visit. Patient was educated on dosing expectations and limits of prescribing as a result of the new [facility] Board rules enacted August 31, 2017. Please also note that this is not the initial opioid prescription issued to this patient but a continuation of medication utilized during the hospital admission as noted in the medical record. OARRS report has also been utilized to screen for any abuse history or suspicious activity as outlined in [place]. All efforts have been taken to prevent abuse potential and misuse of opioid medications.

## 2017-12-15 RX ORDER — OXYCODONE HYDROCHLORIDE AND ACETAMINOPHEN 5; 325 MG/1; MG/1
1 TABLET ORAL EVERY 4 HOURS PRN
Qty: 30 TABLET | Refills: 0 | Status: SHIPPED | OUTPATIENT
Start: 2017-12-15 | End: 2017-12-22 | Stop reason: SDUPTHER

## 2017-12-15 NOTE — TELEPHONE ENCOUNTER
Okay to fill. [x]  OARRS ([place] Automated Rx Reporting System):          OARRS Report on Stephanie Wren was run: 12/8/17. Active Cumulative Morphine Equivalent was reviewed. Documentation is scanned into the chart. Patient being given increased dosage/quantity of opioid pain medication in excess of OSMB guidelines which noted a 30 MED of opioids due to the fact that he has undergone major orthopaedic surgery as outlined in rule 4731-11-13. Dosages and further duration of the pain medication will be re-evaluated at his post op visit. Patient was educated on dosing expectations and limits of prescribing as a result of the new [facility] Board rules enacted August 31, 2017. Please also note that this is not the initial opioid prescription issued to this patient but a continuation of medication utilized during the hospital admission as noted in the medical record. OARRS report has also been utilized to screen for any abuse history or suspicious activity as outlined in [place]. All efforts have been taken to prevent abuse potential and misuse of opioid medications.

## 2017-12-18 ENCOUNTER — OFFICE VISIT (OUTPATIENT)
Dept: ORTHOPEDIC SURGERY | Age: 62
End: 2017-12-18

## 2017-12-18 VITALS
DIASTOLIC BLOOD PRESSURE: 92 MMHG | WEIGHT: 261 LBS | SYSTOLIC BLOOD PRESSURE: 142 MMHG | BODY MASS INDEX: 37.37 KG/M2 | HEIGHT: 70 IN | HEART RATE: 79 BPM

## 2017-12-18 DIAGNOSIS — Z96.652 S/P TKR (TOTAL KNEE REPLACEMENT) USING CEMENT, LEFT: ICD-10-CM

## 2017-12-18 DIAGNOSIS — M25.562 CHRONIC PAIN OF LEFT KNEE: Primary | ICD-10-CM

## 2017-12-18 DIAGNOSIS — G89.29 CHRONIC PAIN OF LEFT KNEE: Primary | ICD-10-CM

## 2017-12-18 PROCEDURE — 99024 POSTOP FOLLOW-UP VISIT: CPT | Performed by: PHYSICIAN ASSISTANT

## 2017-12-18 RX ORDER — DICLOFENAC SODIUM 75 MG/1
75 TABLET, DELAYED RELEASE ORAL 2 TIMES DAILY
Qty: 60 TABLET | Refills: 3 | Status: SHIPPED | OUTPATIENT
Start: 2017-12-18 | End: 2018-02-09 | Stop reason: ALTCHOICE

## 2017-12-18 NOTE — LETTER
pain, increasing swelling and there are no other reported complications. He is doing his physical therapy exercises. He does feel his knee is doing well at physical therapy. He still does have pain. He will take his last dose of aspirin on Wednesday. He rates his pain as a 0 out of 10 at rest and a 6 out of 10 with activity. He describes his pain as dull and aching. It occurs for hours and is constant. Kneeling, squatting, standing, walking and stairs aggravate his pain. Rest and Percocet help relieve his pain. He does have night pain. He does have morning stiffness. Regarding Aspirin Therapy and VTE Precautions:  Saundra Mccord reports he is  taking his aspirin 325 mg twice a day. He denies abnormal bruising or abnormal bleeding from any body orifice such as bleeding from nose or gums, blood in urine or stool, or melena, hemoptysis or hematemesis. He is not wearing his HIRA hose stockings.     PAIN ASSESSMENT:   Pain Assessment  Location of Pain: Knee  Location Modifiers: Left  Severity of Pain: 6  Quality of Pain: Dull, Aching  Duration of Pain: A few hours  Frequency of Pain: Constant  Date Pain First Started:  (from sx )  Aggravating Factors: Squatting, Standing, Kneeling, Walking, Stairs  Limiting Behavior: Yes  Relieving Factors: Rest  Result of Injury: No  Work-Related Injury: No  Are there other pain locations you wish to document?: No    Patient Active Problem List   Diagnosis    Status post total right knee replacement 1/24/2017    S/P left knee arthroscopy 40 years ago    Chronic pain of left knee    S/P TKR (total knee replacement) using cement, left on 11/8/2017    Hypertension     Past Medical History:   Diagnosis Date    Anesthesia     difficulty urinating after back surgery     Arthritis     Chronic back pain     relieved with surgery in 2015    Hypertension     Hypertension 11/8/2017    Pinched nerve     L2-L5    Retention of urine      Past Surgical History: Procedure Laterality Date    BACK SURGERY      COLONOSCOPY      KNEE SURGERY  x 3    OTHER SURGICAL HISTORY Left 11/08/2017    LEFT TOTAL KNEE ARTHROPLASTY              SHOULDER SURGERY      TOTAL KNEE ARTHROPLASTY Right 01/24/2017    RIGHT TOTAL KNEE ARTHROPLASTY                Allergies:  Penicillins; Bee venom; and Sulfa antibiotics    Medications:  Prior to Visit Medications    Medication Sig Taking? Authorizing Provider   diclofenac (VOLTAREN) 75 MG EC tablet Take 1 tablet by mouth 2 times daily Yes JUDI Francis   oxyCODONE-acetaminophen (PERCOCET) 5-325 MG per tablet Take 1 tablet by mouth every 4 hours as needed for Pain . Earliest Fill Date: 12/15/17  JUDI Francis   aspirin 325 MG EC tablet Take 1 tablet by mouth 2 times daily (with meals)  JUDI Francis   lisinopril (PRINIVIL;ZESTRIL) 30 MG tablet Take 30 mg by mouth daily  Historical Provider, MD   tamsulosin (FLOMAX) 0.4 MG capsule Take 0.4 mg by mouth daily 30 minutes after meal   Historical Provider, MD     Social History     Social History    Marital status:      Spouse name: N/A    Number of children: N/A    Years of education: N/A     Occupational History    Not on file. Social History Main Topics    Smoking status: Former Smoker     Types: Cigars    Smokeless tobacco: Never Used    Alcohol use Yes      Comment: monthly    Drug use: No      Comment: CIGAR    Sexual activity: Not on file     Other Topics Concern    Not on file     Social History Narrative    No narrative on file     Family History   Problem Relation Age of Onset    Diabetes Mother        Review of Systems (ROS):    Performed. Zach Powell's review of systems has been performed by intake and observation. The most recent ROS was scanned into the media tab of the chart on 7/28/2017.  He has been instructed to contact his primary care physician regarding ROS issues if not already being addressed at this time.  There are no recent changes. Objective:   Physical Exam  Vital Signs:  BP (!) 142/92   Pulse 79   Ht 5' 10\" (1.778 m)   Wt 261 lb (118.4 kg)   BMI 37.45 kg/m²      Constitution:  Generally, Laila Chan is  alert,  appears stated age, and  in no distress. His general body habitus is  Cachectic   Thin   Normal   Obese   Morbidly Obese    Head:  Normocephalic  Eyes:  Extra-occular muscles intact    Left Ear:  External Ear normal   Right Ear:  External Ear normal   Nose:  Normal  Mouth:  Oral mucosa moist   No perioral lesions    Pulmonary:  Respirations unlabored and regular  Skin:  Warm  Well perfused     Psychiatric:    Good judgement and insight   Oriented to  person,  place, and  time. Mood appropriate for circumstances. Gait:   Gait is  Normal   Impaired  Using cane  Assistive Device:  None   Knee Brace   Cane   Crutches    Walker    Wheelchair   Other    ORTHOPAEDIC KNEE EXAM: LEFT   Inspection:   Skin intact without abrasion or lacerations. Incisions with no signs of infection (red, warmth, drainage) and healing well   Incisions well healed   Ecchymosis:   none   mild   moderate   severe   Atrophy:   none   mild   moderate   severe   Effusion:  none   mild   moderate   severe   Scar / Surgical incision(s):   A-Scope Portals   Open Surgical Incision(s)    Alignment:   Normal   Varus  Valgus    Range of Motion:   Normal Knee ROM          Deferred: acute injury/post-surgery/pain    Limited ROM: Normal post-op (0-110 degrees)   secondary to normal post-op pain/limitations    Palpation:    No Tenderness   Tenderness:   mild   moderate   severe     secondary to normal post-op pain/limitations   Baker's Cyst:   none   small   medium   large   Kedar's Sign:  negative   positive    Motor Function:    No gross motor weakness   Motor weakness:  mild   moderate   severe     secondary to normal post-op pain/limitations    Neurologic:   Sensation to light touch intact Coordination / proprioception intact    Circulation:   The limb is warm and well perfused   Capillary refill is intact. Edema   none   mild   moderate   severe   Venous stasis changes   none   mild   moderate   severe    Data Reviewed:     No imaging studies were obtained today. XRays:  (3 views: AP, lateral and patella views) of his left knee taken 11/20/17 showed a satisfactory TKR with no evidence of acute fracture or loosening. Assessment (Medical Decision Making): Aftercare following joint replacement surgery:   (ICD10 code: Z47.1)       Left TKR (Total knee replacement) (ICD10 code: E95.063)     Huyen Viveros is a 65y.o. year old male with the following diagnosis:    1. Chronic pain of left knee     2. S/P TKR (total knee replacement) using cement, left on 11/8/2017         His overall course:         Responding adequately to ongoing treatment after surgery      Worsening despite postoperative treatment       Unchanged despite postoperative treatment     Plan (Medical Decision Making):        Continue deep venous thrombosis prophylaxis:    HIRA Parker    Aspirin 325 mg PO BID x 6 weeks post-op. Once he has finished the aspirin he may start the diclofenac. Continue physical therapy    Continue pain control: Ice and percocet    Ice to knee. Rub Vit E oil on the incision(s) once a day. Refills/scripts given today: Diclofenac. Return to Clinic/Follow - Up:  Huyen Viveros was asked to make a follow-up appointment:     6 weeks    Huyen Viveros was instructed to call the office if his symptoms worsen or if new symptoms appear prior to the next scheduled visit. He is specifically instructed to contact the office between now & his scheduled appointment if he has concerns related to his condition. He is welcome to call for an appointment sooner if he has any additional concerns or questions.           Patient Instructions     PATIENT INSTRUCTIONS  For Huyen Viveros Prophylactic antibiotics before routine dental cleaning are not required. Other surgeries/procedures do require antibiotics (colonoscopy, abcesses/ active infections, etc.)    If you are NOT allergic to penicillin: 2 grams of amoxicillin, cephalexin or cephradine (orally) OR 2 grams of ampicillin or 1 gram of cefazolin (intra-muscularly or intravenously) 1 hour before the procedure. If you ARE allergic to penicillin[de-identified] 600 milligrams of clindamycin (orally or intravenously) 1 hour before the procedure. PLEASE NOTE: Antibiotics may vary based on clinical situation and culture results. PATIENT REMINDER:   Carry a list of your medications and allergies with you at all times. Call your pharmacy and our office at least 1 week in advance to refill prescriptions. Narcotic medications will not be refilled after hours or on weekends. ATTENTION    As of October 2, 2014, the [facility] ([facility]) has mandated that ALL PRESCRIPTION PAIN MEDICINE cannot be called into your pharmacy. This includes:    Oxycodone (Percocet)  Hydrocodone (Vicodin, Norco)  Tramadol (Ultram)  Other    These medications must have prescriptions which are written and signed by your provider. This means that you must call ahead and come in to the office to  the paper prescription and take it to your pharmacy. We are sorry for any inconvenience but this is now the law. Nba Mackey PA-C  Physician Assistant, Orthopaedic Surgery    Contact Information:  Lori Stanley,  &  Clinical Staff  [phone], Option 3         IMPORTANT NARCOTIC INFORMATION: PLEASE READ      DO NOT share your prescription medication with anyone! Sharing is illegal.  The prescription dose is based on your age, weight, and health problems. Sharing your narcotic prescription can lead to accidental death of the individual for which the prescription was not prescribed.   You may not know about his/her addiction problem. Always use the same pharmacy when filling your narcotic prescriptions. DO NOT mix your narcotic prescription with alcohol. Mixing the narcotic prescription medication with alcohol causes depressive effects including breathing problems, organ malfunction, and cardiac arrest.      Always keep your narcotic medication in a locked, secured location. Keep your medication private. This is to avoid individuals from taking your medication without your knowledge. This medication is highly sought after and locking your prescriptions will protect you from being a target of crime. DO NOT stock pile your medication. This also will protect you from being a target of crime. Dispose of any unused medications properly. Do not flush the medications. Look for appropriate waste collection programs in your community and drug take back events. DO NOT drive while taking narcotic pain medication or muscle relaxers. REMINDER:  The phases following a total knee replacement that most patients often encounter. Briefly:    First phase: First phase is The Pain Phase. There is plenty of pain medication (and pharmacy can be consulted if needed). TKR is a painful operation however pain management has improved significantly. Second phase: The second phase is Not Sleeping Phase. It is common for total knee replacement patients not to sleep well after total knee replacement. This can go on for several months. Third phase: The third phase is Depression. The \"not sleeping at night phase\" leads to the third phase in which patients often experience depression/the blues, feeling that \"this will never get any better\". It will get better. Fourth phase: The fourth phase is Swelling and Warmth. Around 4-6 weeks, patients will start to compare their total knee replacement knee with their other knee.   They note that the total knee replacement knee is still (YOUTUBE video SMSL FULL SD)                No orders of the defined types were placed in this encounter. Refills/New Prescriptions:  Orders Placed This Encounter   Medications    diclofenac (VOLTAREN) 75 MG EC tablet     Sig: Take 1 tablet by mouth 2 times daily     Dispense:  60 tablet     Refill:  3     Today's prescription medications will be e-scribed (when appropriate) to the Patient's Preferred Pharmacy:   [address]  Phone: [phone] Fax: [phone]       Shira aTylor PA-C  Electronically signed by Shira Taylor PA-C on 12/18/2017 at 4:03 PM     Contact Information:  Sarahi Hunt, [address] [place] [address] Staff  [phone], Option 3    This dictation was performed with a verbal recognition program (DRAGON) and it was checked for errors. It is possible that there are still dictated errors within this office note. If so, please bring any errors to my attention for an addendum. All efforts were made to ensure that this office note is accurate. I have personally performed and/or participated in the history, physical exam and medical decision making and reviewed all pertinent clinical information unless otherwise noted.

## 2017-12-18 NOTE — PROGRESS NOTES
He is not wearing his HIRA hose stockings. PAIN ASSESSMENT:   Pain Assessment  Location of Pain: Knee  Location Modifiers: Left  Severity of Pain: 6  Quality of Pain: Dull, Aching  Duration of Pain: A few hours  Frequency of Pain: Constant  Date Pain First Started:  (from sx )  Aggravating Factors: Squatting, Standing, Kneeling, Walking, Stairs  Limiting Behavior: Yes  Relieving Factors: Rest  Result of Injury: No  Work-Related Injury: No  Are there other pain locations you wish to document?: No    Patient Active Problem List   Diagnosis    Status post total right knee replacement 1/24/2017    S/P left knee arthroscopy 40 years ago    Chronic pain of left knee    S/P TKR (total knee replacement) using cement, left on 11/8/2017    Hypertension     Past Medical History:   Diagnosis Date    Anesthesia     difficulty urinating after back surgery     Arthritis     Chronic back pain     relieved with surgery in 2015    Hypertension     Hypertension 11/8/2017    Pinched nerve     L2-L5    Retention of urine      Past Surgical History:   Procedure Laterality Date    BACK SURGERY      COLONOSCOPY      KNEE SURGERY  x 3    OTHER SURGICAL HISTORY Left 11/08/2017    LEFT TOTAL KNEE ARTHROPLASTY              SHOULDER SURGERY      TOTAL KNEE ARTHROPLASTY Right 01/24/2017    RIGHT TOTAL KNEE ARTHROPLASTY                Allergies:  Penicillins; Bee venom; and Sulfa antibiotics    Medications:  Prior to Visit Medications    Medication Sig Taking? Authorizing Provider   diclofenac (VOLTAREN) 75 MG EC tablet Take 1 tablet by mouth 2 times daily Yes JUDI Medina   oxyCODONE-acetaminophen (PERCOCET) 5-325 MG per tablet Take 1 tablet by mouth every 4 hours as needed for Pain .  Earliest Fill Date: 12/15/17  JUDI Medina   aspirin 325 MG EC tablet Take 1 tablet by mouth 2 times daily (with meals)  JUDI Medina   lisinopril (PRINIVIL;ZESTRIL) 30 MG tablet Take 30 mg by mouth daily  Historical Provider, MD   tamsulosin (FLOMAX) 0.4 MG capsule Take 0.4 mg by mouth daily 30 minutes after meal   Historical Provider, MD     Social History     Social History    Marital status:      Spouse name: N/A    Number of children: N/A    Years of education: N/A     Occupational History    Not on file. Social History Main Topics    Smoking status: Former Smoker     Types: Cigars    Smokeless tobacco: Never Used    Alcohol use Yes      Comment: monthly    Drug use: No      Comment: CIGAR    Sexual activity: Not on file     Other Topics Concern    Not on file     Social History Narrative    No narrative on file     Family History   Problem Relation Age of Onset    Diabetes Mother        Review of Systems (ROS):    [x]Performed. Izabela Powell's review of systems has been performed by intake and observation. The most recent ROS was scanned into the media tab of the chart on 7/28/2017. He has been instructed to contact his primary care physician regarding ROS issues if not already being addressed at this time. There are no recent changes. Objective:   Physical Exam  Vital Signs:  BP (!) 142/92   Pulse 79   Ht 5' 10\" (1.778 m)   Wt 261 lb (118.4 kg)   BMI 37.45 kg/m²     Constitution:  Generally, Blake Hoang is [x] alert, [x] appears stated age, and [x] in no distress. His general body habitus is [] Cachectic  [] Thin  [] Normal  [x] Obese  [] Morbidly Obese    Head: [x] Normocephalic  Eyes: [x] Extra-occular muscles intact    Left Ear: [x] External Ear normal   Right Ear: [x] External Ear normal   Nose: [x] Normal  Mouth: [x] Oral mucosa moist  [x] No perioral lesions    Pulmonary: [x] Respirations unlabored and regular  Skin: [x] Warm [x] Well perfused     Psychiatric:   [x] Good judgement and insight  [x] Oriented to [x] person, [x] place, and [x] time. [x] Mood appropriate for circumstances.     Gait:   Gait is [] Normal  [x] Impaired  Using cane  Assistive Device: [x] None  [] Knee Brace [x] Margret Borne  [] Crutches   [] Beau Apgar   [] Wheelchair  [] Other    ORTHOPAEDIC KNEE EXAM: LEFT   Inspection:  [x] Skin intact without abrasion or lacerations. [] Incisions with no signs of infection (red, warmth, drainage) and healing well  [x] Incisions well healed  [x] Ecchymosis:  [x] none  [] mild  [] moderate  [] severe  [x] Atrophy:  [x] none  [] mild  [] moderate  [] severe  [x] Effusion: [x] none  [] mild  [] moderate  [] severe  [x] Scar / Surgical incision(s): [] A-Scope Portals  [x] Open Surgical Incision(s)    Alignment:  [x] Normal  [] Varus [] Valgus    Range of Motion:  [] Normal Knee ROM         [] Deferred: acute injury/post-surgery/pain   [x] Limited ROM: Normal post-op (0-110 degrees)  [x] secondary to normal post-op pain/limitations    Palpation:   [x] No Tenderness  [] Tenderness:  [] mild  [] moderate  [] severe    [x] secondary to normal post-op pain/limitations  [x] Baker's Cyst:  [x] none  [] small  [] medium  [] large  [x] Kedar's Sign: [x] negative  [] positive    Motor Function:   [x] No gross motor weakness  [] Motor weakness: [] mild  [] moderate  [] severe    [x] secondary to normal post-op pain/limitations    Neurologic:  [x] Sensation to light touch intact   [x] Coordination / proprioception intact    Circulation:  [x] The limb is warm and well perfused  [x] Capillary refill is intact. [x] Edema  [x] none  [] mild  [] moderate  [] severe  [x] Venous stasis changes  [x] none  [] mild  [] moderate  [] severe    Data Reviewed:     No imaging studies were obtained today. XRays:  (3 views: AP, lateral and patella views) of his left knee taken 17 showed a satisfactory TKR with no evidence of acute fracture or loosening. Assessment (Medical Decision Making): Aftercare following joint replacement surgery:   (ICD10 code: Z47.1)     [x]  Left TKR (Total knee replacement) (ICD10 code: O47.651)     Debbie Vogt is a 65y.o. year old male with the following diagnosis:    1. Chronic pain of left knee     2. S/P TKR (total knee replacement) using cement, left on 11/8/2017         His overall course:       [x]  Responding adequately to ongoing treatment after surgery    []  Worsening despite postoperative treatment     []  Unchanged despite postoperative treatment     Plan (Medical Decision Making):       [x] Continue deep venous thrombosis prophylaxis:    HIRA Praker    Aspirin 325 mg PO BID x 6 weeks post-op. Once he has finished the aspirin he may start the diclofenac. [x] Continue physical therapy   [x] Continue pain control: Ice and percocet   [x] Ice to knee. [x] Rub Vit E oil on the incision(s) once a day. Refills/scripts given today: Diclofenac. Return to Clinic/Follow - Up:  Saundra Mccord was asked to make a follow-up appointment:    [x] 6 weeks    Saundra Mccord was instructed to call the office if his symptoms worsen or if new symptoms appear prior to the next scheduled visit. He is specifically instructed to contact the office between now & his scheduled appointment if he has concerns related to his condition. He is welcome to call for an appointment sooner if he has any additional concerns or questions. Patient Instructions     PATIENT INSTRUCTIONS  For Saundra Mccord  Office Visit with Bernabe Peck PA-C on 12/18/2017    Activity & PT Instructions:   No sports until cleared by MD/PT   No driving if you are taking narcotic pain medications or muscle relaxers   Keep extremity elevated   PT or Home Exercise Program for 3 months post-op    Note: PT is usually twice a week for 3 months (will be extended if necessary)   \"Remember to: [employer] and [employer]" your knee as instructed by Dr Diaz Plants    Weight Bearing:   Full weightbearing as tolerated if cleared by MD/PT    Wound Instructions:   Use Vit E oil on incisions once a day   If in the sun, protect incisions from sun by using:     Sunscreen 30-50 SPF, reapply regularly.    Elevate operative limb times. Call your pharmacy and our office at least 1 week in advance to refill prescriptions. Narcotic medications will not be refilled after hours or on weekends. ATTENTION    As of October 2, 2014, the [place] [address] ([address]) has mandated that ALL PRESCRIPTION PAIN MEDICINE cannot be called into your pharmacy. This includes:    Oxycodone (Percocet)  Hydrocodone (Vicodin, Norco)  Tramadol (Ultram)  Other    These medications must have prescriptions which are written and signed by your provider. This means that you must call ahead and come in to the office to  the paper prescription and take it to your pharmacy. We are sorry for any inconvenience but this is now the law. Bar Buckley PA-C  Physician Assistant, Orthopaedic Surgery    Contact Information:  Gerald Pastrana,  &  Clinical Staff  [phone], Option 3         IMPORTANT NARCOTIC INFORMATION: PLEASE READ     [x] DO NOT share your prescription medication with anyone! Sharing is illegal.  The prescription dose is based on your age, weight, and health problems. Sharing your narcotic prescription can lead to accidental death of the individual for which the prescription was not prescribed. You may not know about his/her addiction problem. [x] Always use the same pharmacy when filling your narcotic prescriptions. [x] DO NOT mix your narcotic prescription with alcohol. Mixing the narcotic prescription medication with alcohol causes depressive effects including breathing problems, organ malfunction, and cardiac arrest.     [x] Always keep your narcotic medication in a locked, secured location. Keep your medication private. This is to avoid individuals from taking your medication without your knowledge. This medication is highly sought after and locking your prescriptions will protect you from being a target of crime. [x] DO NOT stock pile your medication.   This also will protect you are sometimes needed. You can take your vitamin D3 at any time of the day, with or without food, with or without calcium. Because vitamin D is long acting, if you miss your vitamin D on one day you can double up the dose on a later day. Exercise: Low impact exercise programs such as [employer]. Chair Raise Exercise. Yoga. Adult fitness classes at the  or community [place]. Physical Therapy: Formal physical therapy program to strengthen your lower extremities, improve your balance and gait. Home Assessment: Remove clutter and tripping hazards: loose/throw rugs, cords or cables. Install or placement of railings, grab bars around steps/stairs and in the bathroom (toilet, bath tub, sink). Improve lighting. Foot Wear:  Stable, well fitting. Avoid loose straps or ties, slippery soles. Vision/Eye Check Up: Have your vision checked yearly. Resources:  www.cdc.gov/injury/STEADI    1). STEADI: Stay Independent Self Risk Assessment    2). CDC Handouts: How to prevent falls, Home Safety Fall Prevention         If you or someone you know struggles with weight issues and joint pain, please check out this important documentary:      \"Start Moving Start Living\" =  Http://startmovingA [employer]       (YOUTUBE video SMSL FULL SD)                No orders of the defined types were placed in this encounter.       Refills/New Prescriptions:  Orders Placed This Encounter   Medications    diclofenac (VOLTAREN) 75 MG EC tablet     Sig: Take 1 tablet by mouth 2 times daily     Dispense:  60 tablet     Refill:  3     Today's prescription medications will be e-scribed (when appropriate) to the Patient's Preferred Pharmacy:   [address]  Phone: [phone] Fax: [phone]       Sonny Phillips PA-C  Electronically signed by Sonny Phillips PA-C on 12/18/2017 at 4:03 PM     Contact Information:  Gerald Pastrana,  & Clinical Staff  [phone], Option 3    This dictation was performed with a verbal recognition program (DRAGON) and it was checked for errors. It is possible that there are still dictated errors within this office note. If so, please bring any errors to my attention for an addendum. All efforts were made to ensure that this office note is accurate. I have personally performed and/or participated in the history, physical exam and medical decision making and reviewed all pertinent clinical information unless otherwise noted.

## 2017-12-18 NOTE — PATIENT INSTRUCTIONS
PATIENT INSTRUCTIONS  For Rahul Cardona  Office Visit with Natacha Escalona PA-C on 12/18/2017    Activity & PT Instructions:   No sports until cleared by MD/PT   No driving if you are taking narcotic pain medications or muscle relaxers   Keep extremity elevated   PT or Home Exercise Program for 3 months post-op    Note: PT is usually twice a week for 3 months (will be extended if necessary)   \"Remember to: [employer] and [employer]" your knee as instructed by Dr Nova Lopez    Weight Bearing:   Full weightbearing as tolerated if cleared by MD/PT    Wound Instructions:   Use Vit E oil on incisions once a day   If in the sun, protect incisions from sun by using:     Sunscreen 30-50 SPF, reapply regularly. Elevate operative limb above heart level   Ice to knee for 15  20 minutes 3 x day    (\"ICE IS YOUR FRIEND\": try using a bag of frozen peas or corn)      Call the office ([phone], Option 3) if:     The incision becomes red, swollen or develops drainage. You develop a fever greater than 101 degrees. Follow-up Care:   I have asked Rahul Cardona to schedule a follow-up appointment in 6 to 8 weeks. He is specifically instructed to contact the office between now & his scheduled appointment if he has concerns related to his condition. He is welcome to call for an appointment sooner if he has any additional concerns or questions. Medication Instructions: for Rahul Cardona  Allergies: Penicillins; Bee venom; and Sulfa antibiotics     Any prescriptions must be used as directed. Narcotic prescriptions will be printed out and given to you in the office. Non-narcotic prescriptions will be sent to your pharmacy for pickup to be use as directed unless you request a printed prescription. If you have any concerns, questions or require refills, please contact our office ([phone], Option 3). If you experience any adverse reactions, stop the medication and call our office immediately.       Antibiotics knee replacement knee is still a bit more swollen and a bit more warm compared to the other knee. This is normal.    Fifth phase: The fifth phase is \"my knee makes noises\" phase. It is common for patients to note that their total knee replacement makes noises. FALL PREVENTION:  SIMPLE TIPS    Vitamin D3:  Over-the-counter supplement of Vitamin D3, (at least 2,000 IU daily). Vitamin D3 is widely available without a prescription at pharmacies and buying clubs ([facility], [facility]) and on-line at sites such as Amazon.com. It comes in a variety of formulations (tablets, gelcaps, liquid) and doses (1,000 IU, 2,000 IU, 4,000 IU, 5,000 IU and even higher). The right dose for most people is 2,000 IU per day but higher doses are sometimes needed. You can take your vitamin D3 at any time of the day, with or without food, with or without calcium. Because vitamin D is long acting, if you miss your vitamin D on one day you can double up the dose on a later day. Exercise: Low impact exercise programs such as [employer]. Chair Raise Exercise. Yoga. Adult fitness classes at the  or community [place]. Physical Therapy: Formal physical therapy program to strengthen your lower extremities, improve your balance and gait. Home Assessment: Remove clutter and tripping hazards: loose/throw rugs, cords or cables. Install or placement of railings, grab bars around steps/stairs and in the bathroom (toilet, bath tub, sink). Improve lighting. Foot Wear:  Stable, well fitting. Avoid loose straps or ties, slippery soles. Vision/Eye Check Up: Have your vision checked yearly. Resources:  www.cdc.gov/injury/STEADI    1). STEADI: Stay Independent Self Risk Assessment    2). CDC Handouts:  How to prevent falls, Home Safety Fall Prevention         If you or someone you know struggles with weight issues and joint pain, please check out this important documentary:      \"Start Moving Start Living\" =

## 2017-12-20 ENCOUNTER — TELEPHONE (OUTPATIENT)
Dept: ORTHOPEDIC SURGERY | Age: 62
End: 2017-12-20

## 2017-12-20 NOTE — TELEPHONE ENCOUNTER
Pt is S/P LEFT TKR done on 11/8/17    Current post op pain/inflammations Meds:  Percocet         If not taking NSAIDS: Contraindications? Aspirin     Allergy,     Bleeding,     Blood thinners,     Gastric ulcer      Medrol Dosepak: No    Frequent / Intermittent Ice: yes    Most recent flexion recording on per Physical Therapy     12/14/17  0-109      Route to Dr Ottoniel León for review and instructions if needed.

## 2017-12-22 DIAGNOSIS — Z96.652 S/P TKR (TOTAL KNEE REPLACEMENT) USING CEMENT, LEFT: Primary | ICD-10-CM

## 2017-12-22 RX ORDER — OXYCODONE HYDROCHLORIDE AND ACETAMINOPHEN 5; 325 MG/1; MG/1
1 TABLET ORAL EVERY 4 HOURS PRN
Qty: 30 TABLET | Refills: 0 | Status: SHIPPED | OUTPATIENT
Start: 2017-12-22 | End: 2017-12-29 | Stop reason: SDUPTHER

## 2017-12-27 ENCOUNTER — TELEPHONE (OUTPATIENT)
Dept: ORTHOPEDIC SURGERY | Age: 62
End: 2017-12-27

## 2017-12-27 NOTE — TELEPHONE ENCOUNTER
Pt is S/P LEFT TKR done on 11/8/17    Current post op pain/inflammations Meds:  Percocet     Diclofenac    If not taking NSAIDS: Contraindications? Allergy,     Bleeding,     Blood thinners,     Gastric ulcer      Medrol Dosepak: No    Frequent / Intermittent Ice: yes    Most recent flexion recording on per Physical Therapy     12/27/17   0-113      Route to Dr Valeria Petersen for review and instructions if needed.

## 2017-12-29 DIAGNOSIS — Z96.652 S/P TKR (TOTAL KNEE REPLACEMENT) USING CEMENT, LEFT: ICD-10-CM

## 2017-12-29 RX ORDER — OXYCODONE HYDROCHLORIDE AND ACETAMINOPHEN 5; 325 MG/1; MG/1
1 TABLET ORAL EVERY 4 HOURS PRN
Qty: 30 TABLET | Refills: 0 | Status: SHIPPED | OUTPATIENT
Start: 2017-12-29 | End: 2018-01-05 | Stop reason: SDUPTHER

## 2017-12-29 NOTE — TELEPHONE ENCOUNTER
Okay to fill. OARRS ([facility] Automated Rx Reporting System):  OARRS Report on Lisa Grissom run: 12/8/17.  Edmond Levy Cumulative Morphine Equivalent was reviewed. Documentation is scanned into the chart.      Patient being given increased dosage/quantity of opioid pain medication in excess of [facility] guidelines which noted a 30 MED of opioids due to the fact that he has undergone major orthopaedic surgery as outlined in rule 6723-07-54.  Dosages and further duration of the pain medication will be re-evaluated at his post op visit. Juliette Bales was educated on dosing expectations and limits of prescribing as a result of the new [address] [place] [address] rules enacted August 31, 2017.  Please also note that this is not the initial opioid prescription issued to this patient but a continuation of medication utilized during the hospital admission as noted in the medical record. Robel Womack report has also been utilized to screen for any abuse history or suspicious activity as outlined in [place].  All efforts have been taken to prevent abuse potential and misuse of opioid medications.

## 2018-01-05 ENCOUNTER — TELEPHONE (OUTPATIENT)
Dept: ORTHOPEDIC SURGERY | Age: 63
End: 2018-01-05

## 2018-01-05 DIAGNOSIS — Z96.652 S/P TKR (TOTAL KNEE REPLACEMENT) USING CEMENT, LEFT: ICD-10-CM

## 2018-01-05 RX ORDER — OXYCODONE HYDROCHLORIDE AND ACETAMINOPHEN 5; 325 MG/1; MG/1
1 TABLET ORAL EVERY 4 HOURS PRN
Qty: 30 TABLET | Refills: 0 | Status: SHIPPED | OUTPATIENT
Start: 2018-01-05 | End: 2018-01-10

## 2018-01-05 NOTE — TELEPHONE ENCOUNTER
LT TKR 11/8/17    Garrick Giles from [facility] calling with measurements:    0-115.

## 2018-01-10 ENCOUNTER — TELEPHONE (OUTPATIENT)
Dept: ORTHOPEDIC SURGERY | Age: 63
End: 2018-01-10

## 2018-01-10 NOTE — TELEPHONE ENCOUNTER
Pt is S/P LEFT TKR done on 11/8/17    Current post op pain/inflammations Meds:  Percocet     Diclofenac    If not taking NSAIDS: Contraindications? Allergy,     Bleeding,     Blood thinners,     Gastric ulcer      Medrol Dosepak: No    Frequent / Intermittent Ice: yes    Most recent flexion recording on per Physical Therapy     1/5/18  0-115      Route to Dr Hanna Marshall for review and instructions if needed.

## 2018-01-12 DIAGNOSIS — Z96.652 S/P TKR (TOTAL KNEE REPLACEMENT) USING CEMENT, LEFT: ICD-10-CM

## 2018-01-12 DIAGNOSIS — Z96.652 S/P TKR (TOTAL KNEE REPLACEMENT) USING CEMENT, LEFT: Primary | ICD-10-CM

## 2018-01-12 RX ORDER — OXYCODONE HYDROCHLORIDE AND ACETAMINOPHEN 5; 325 MG/1; MG/1
1 TABLET ORAL EVERY 4 HOURS PRN
Qty: 30 TABLET | Refills: 0 | OUTPATIENT
Start: 2018-01-12 | End: 2018-01-17

## 2018-01-12 RX ORDER — HYDROCODONE BITARTRATE AND ACETAMINOPHEN 5; 325 MG/1; MG/1
1 TABLET ORAL EVERY 4 HOURS PRN
Qty: 30 TABLET | Refills: 0 | Status: SHIPPED | OUTPATIENT
Start: 2018-01-12 | End: 2018-01-19

## 2018-01-19 DIAGNOSIS — Z96.652 S/P TKR (TOTAL KNEE REPLACEMENT) USING CEMENT, LEFT: ICD-10-CM

## 2018-01-19 RX ORDER — HYDROCODONE BITARTRATE AND ACETAMINOPHEN 5; 325 MG/1; MG/1
1 TABLET ORAL EVERY 4 HOURS PRN
Qty: 30 TABLET | Refills: 0 | OUTPATIENT
Start: 2018-01-19 | End: 2018-01-26

## 2018-01-26 DIAGNOSIS — Z96.652 S/P TKR (TOTAL KNEE REPLACEMENT) USING CEMENT, LEFT: Primary | ICD-10-CM

## 2018-01-26 RX ORDER — TRAMADOL HYDROCHLORIDE 50 MG/1
50 TABLET, COATED ORAL EVERY 6 HOURS PRN
Qty: 30 TABLET | Refills: 0 | Status: SHIPPED | OUTPATIENT
Start: 2018-01-26 | End: 2018-02-02 | Stop reason: SDUPTHER

## 2018-02-02 DIAGNOSIS — Z96.652 S/P TKR (TOTAL KNEE REPLACEMENT) USING CEMENT, LEFT: ICD-10-CM

## 2018-02-02 RX ORDER — TRAMADOL HYDROCHLORIDE 50 MG/1
50 TABLET, COATED ORAL EVERY 6 HOURS PRN
Qty: 30 TABLET | Refills: 0 | Status: SHIPPED | OUTPATIENT
Start: 2018-02-02 | End: 2018-02-09 | Stop reason: ALTCHOICE

## 2018-02-02 NOTE — TELEPHONE ENCOUNTER
without your knowledge. This medication is highly sought after and locking your prescriptions will protect you from being a target of crime. [x] DO NOT stock pile your medication. This also will protect you from being a target of crime. [x] Dispose of any unused medications properly. Do not flush the medications. Look for appropriate waste collection programs in your community and drug take back events. [x] DO NOT drive while taking narcotic pain medication or muscle relaxers. Medication Instructions:  Any prescriptions given to you must be used as directed. Narcotic prescriptions will be printed out and given to you in the office. Non-narcotic prescriptions will be sent to your pharmacy for pickup to be use as directed unless you request a printed prescription. If you have any concerns, questions or require refills, please contact our office. If you experience any adverse reactions, stop the medication and call our office immediately. PATIENT REMINDER:   Carry a list of your medications and allergies with you at all times. Call your pharmacy and our office at least 1 week in advance to refill prescriptions. Narcotic medications will not be refilled after hours or on weekends.

## 2018-02-09 ENCOUNTER — OFFICE VISIT (OUTPATIENT)
Dept: ORTHOPEDIC SURGERY | Age: 63
End: 2018-02-09

## 2018-02-09 VITALS
SYSTOLIC BLOOD PRESSURE: 160 MMHG | HEIGHT: 70 IN | HEART RATE: 77 BPM | BODY MASS INDEX: 37.37 KG/M2 | DIASTOLIC BLOOD PRESSURE: 94 MMHG | WEIGHT: 261 LBS

## 2018-02-09 DIAGNOSIS — Z96.652 S/P TKR (TOTAL KNEE REPLACEMENT) USING CEMENT, LEFT: Primary | ICD-10-CM

## 2018-02-09 DIAGNOSIS — M25.562 CHRONIC PAIN OF LEFT KNEE: ICD-10-CM

## 2018-02-09 DIAGNOSIS — G89.29 CHRONIC PAIN OF LEFT KNEE: ICD-10-CM

## 2018-02-09 PROCEDURE — 99024 POSTOP FOLLOW-UP VISIT: CPT | Performed by: PHYSICIAN ASSISTANT

## 2018-02-09 RX ORDER — NAPROXEN 500 MG/1
500 TABLET ORAL 2 TIMES DAILY WITH MEALS
Qty: 60 TABLET | Refills: 2 | Status: SHIPPED | OUTPATIENT
Start: 2018-02-09 | End: 2018-03-20

## 2018-02-09 RX ORDER — METHYLPREDNISOLONE 4 MG/1
TABLET ORAL
Qty: 1 KIT | Refills: 0 | Status: SHIPPED | OUTPATIENT
Start: 2018-02-09 | End: 2018-03-20

## 2018-02-09 NOTE — PROGRESS NOTES
Main Topics    Smoking status: Former Smoker     Types: Cigars    Smokeless tobacco: Never Used    Alcohol use Yes      Comment: monthly    Drug use: No      Comment: CIGAR    Sexual activity: Not on file     Other Topics Concern    Not on file     Social History Narrative    No narrative on file     Family History   Problem Relation Age of Onset    Diabetes Mother        Review of Systems (ROS):    [x]Performed. Monae Powell's review of systems has been performed by intake and observation. The most recent ROS was scanned into the media tab of the chart on 7/28/2017. He has been instructed to contact his primary care physician regarding ROS issues if not already being addressed at this time. There are no recent changes. Objective:   Physical Exam  Vital Signs:  BP (!) 160/94   Pulse 77   Ht 5' 10\" (1.778 m)   Wt 261 lb (118.4 kg)   BMI 37.45 kg/m²     Constitution:  Generally, Suraj Little is [x] alert, [x] appears stated age, and [x] in no distress. His general body habitus is [] Cachectic  [] Thin  [] Normal  [x] Obese  [] Morbidly Obese    Head: [x] Normocephalic  Eyes: [x] Extra-occular muscles intact    Left Ear: [x] External Ear normal   Right Ear: [x] External Ear normal   Nose: [x] Normal  Mouth: [x] Oral mucosa moist  [x] No perioral lesions    Pulmonary: [x] Respirations unlabored and regular  Skin: [x] Warm [x] Well perfused     Psychiatric:   [x] Good judgement and insight  [x] Oriented to [x] person, [x] place, and [x] time. [x] Mood appropriate for circumstances. Gait:   Gait is [x] Normal  [] Impaired  Assistive Device: [x] None  [] Knee Brace  [] Ulysees   [] Crutches   [] Madelon Murrain   [] Wheelchair  [] Other    ORTHOPAEDIC KNEE EXAM: LEFT   Inspection:  [x] Skin intact without abrasion or lacerations.   [] Incisions with no signs of infection (red, warmth, drainage) and healing well  [x] Incisions well healed  [x] Ecchymosis:  [x] none  [] mild  [] moderate  [] severe  [x] using a bag of frozen peas or corn)      Call the office ([phone], Option 3) if:     The incision becomes red, swollen or develops drainage. You develop a fever greater than 101 degrees. Follow-up Care:   I have asked Taj Barakat to schedule a follow-up appointment in 6 weeks. He is specifically instructed to contact the office between now & his scheduled appointment if he has concerns related to his condition. He is welcome to call for an appointment sooner if he has any additional concerns or questions. Medication Instructions: for Taj Barakat  Allergies: Penicillins; Bee venom; and Sulfa antibiotics     Any prescriptions must be used as directed. Narcotic prescriptions will be printed out and given to you in the office. Non-narcotic prescriptions will be sent to your pharmacy for pickup to be use as directed unless you request a printed prescription. If you have any concerns, questions or require refills, please contact our office ([phone], Option 3). If you experience any adverse reactions, stop the medication and call our office immediately. Antibiotics after Total Knee Replacements:     [x] Prophylactic antibiotics before routine dental cleaning are not required. [x] Other surgeries/procedures do require antibiotics (colonoscopy, abcesses/ active infections, etc.)    If you are NOT allergic to penicillin: 2 grams of amoxicillin, cephalexin or cephradine (orally) OR 2 grams of ampicillin or 1 gram of cefazolin (intra-muscularly or intravenously) 1 hour before the procedure. If you ARE allergic to penicillin[de-identified] 600 milligrams of clindamycin (orally or intravenously) 1 hour before the procedure. PLEASE NOTE: Antibiotics may vary based on clinical situation and culture results. PATIENT REMINDER:   Carry a list of your medications and allergies with you at all times. Call your pharmacy and our office at least 1 week in advance to refill prescriptions. Narcotic medications will not be refilled after hours or on weekends. ATTENTION    As of October 2, 2014, the [place] [address] ([address]) has mandated that ALL PRESCRIPTION PAIN MEDICINE cannot be called into your pharmacy. This includes:    Oxycodone (Percocet)  Hydrocodone (Vicodin, Norco)  Tramadol (Ultram)  Other    These medications must have prescriptions which are written and signed by your provider. This means that you must call ahead and come in to the office to  the paper prescription and take it to your pharmacy. We are sorry for any inconvenience but this is now the law. Nelia Hays PA-C  Physician Assistant, Orthopaedic Surgery    Contact Information:  Domitila Reyes,  &  Clinical Staff  [phone], Option 3         IMPORTANT NARCOTIC INFORMATION: PLEASE READ     [x] DO NOT share your prescription medication with anyone! Sharing is illegal.  The prescription dose is based on your age, weight, and health problems. Sharing your narcotic prescription can lead to accidental death of the individual for which the prescription was not prescribed. You may not know about his/her addiction problem. [x] Always use the same pharmacy when filling your narcotic prescriptions. [x] DO NOT mix your narcotic prescription with alcohol. Mixing the narcotic prescription medication with alcohol causes depressive effects including breathing problems, organ malfunction, and cardiac arrest.     [x] Always keep your narcotic medication in a locked, secured location. Keep your medication private. This is to avoid individuals from taking your medication without your knowledge. This medication is highly sought after and locking your prescriptions will protect you from being a target of crime. [x] DO NOT stock pile your medication. This also will protect you from being a target of crime. [x] Dispose of any unused medications properly.   Do not flush the medications. Look for appropriate waste collection programs in your community and drug take back events. [x] DO NOT drive while taking narcotic pain medication or muscle relaxers. REMINDER:  The phases following a total knee replacement that most patients often encounter. Briefly:    First phase: First phase is The Pain Phase. There is plenty of pain medication (and pharmacy can be consulted if needed). TKR is a painful operation however pain management has improved significantly. Second phase: The second phase is Not Sleeping Phase. It is common for total knee replacement patients not to sleep well after total knee replacement. This can go on for several months. Third phase: The third phase is Depression. The \"not sleeping at night phase\" leads to the third phase in which patients often experience depression/the blues, feeling that \"this will never get any better\". It will get better. Fourth phase: The fourth phase is Swelling and Warmth. Around 4-6 weeks, patients will start to compare their total knee replacement knee with their other knee. They note that the total knee replacement knee is still a bit more swollen and a bit more warm compared to the other knee. This is normal.    Fifth phase: The fifth phase is \"my knee makes noises\" phase. It is common for patients to note that their total knee replacement makes noises. No orders of the defined types were placed in this encounter. Refills/New Prescriptions:  Orders Placed This Encounter   Medications    methylPREDNISolone (MEDROL DOSEPACK) 4 MG tablet     Sig: Take by mouth as directed. Dispense:  1 kit     Refill:  0    naproxen (NAPROSYN) 500 MG tablet     Sig: Take 1 tablet by mouth 2 times daily (with meals) Take one tab by mouth twice a day with food.      Dispense:  60 tablet     Refill:  2     Today's prescription medications will be e-scribed (when appropriate) to the Patient's Preferred Pharmacy:   [address] [address] [address] [facility]  [address] [place] [address]  Phone: [phone] Fax: [phone]       Neena Buchanan PA-C  Electronically signed by Neena Buchanan PA-C on 2/9/2018 at 3:23 PM     Contact Information:  Clinical Staff  [phone]    This dictation was performed with a verbal recognition program [facility]) and it was checked for errors. It is possible that there are still dictated errors within this office note. If so, please bring any errors to my attention for an addendum. All efforts were made to ensure that this office note is accurate. I have personally performed and/or participated in the history, physical exam and medical decision making and reviewed all pertinent clinical information unless otherwise noted.

## 2018-02-09 NOTE — PATIENT INSTRUCTIONS
PATIENT INSTRUCTIONS  For Theodor Kehr  Office Visit with Shelby Watson PA-C on 2/9/2018    Activity & PT Instructions:   No sports until cleared by MD/PT   No driving if you are taking narcotic pain medications or muscle relaxers   Keep extremity elevated   PT or Home Exercise Program for 3 months post-op    Note: PT is usually twice a week for 3 months (will be extended if necessary)   \"Remember to: [employer] and [employer]" your knee as instructed by Dr Villalobos Ramp    Weight Bearing:   Full weightbearing as tolerated if cleared by MD/PT    Wound Instructions:   Use Vit E oil on incisions once a day   If in the sun, protect incisions from sun by using:     Sunscreen 30-50 SPF, reapply regularly. Elevate operative limb above heart level   Ice to knee for 15  20 minutes 3 x day    (\"ICE IS YOUR FRIEND\": try using a bag of frozen peas or corn)      Call the office ([phone], Option 3) if:     The incision becomes red, swollen or develops drainage. You develop a fever greater than 101 degrees. Follow-up Care:   I have asked Theodor Kehr to schedule a follow-up appointment in 6 weeks. He is specifically instructed to contact the office between now & his scheduled appointment if he has concerns related to his condition. He is welcome to call for an appointment sooner if he has any additional concerns or questions. Medication Instructions: for Theodor Kehr  Allergies: Penicillins; Bee venom; and Sulfa antibiotics     Any prescriptions must be used as directed. Narcotic prescriptions will be printed out and given to you in the office. Non-narcotic prescriptions will be sent to your pharmacy for pickup to be use as directed unless you request a printed prescription. If you have any concerns, questions or require refills, please contact our office ([phone], Option 3). If you experience any adverse reactions, stop the medication and call our office immediately.       Antibiotics after prescribed. You may not know about his/her addiction problem. [x] Always use the same pharmacy when filling your narcotic prescriptions. [x] DO NOT mix your narcotic prescription with alcohol. Mixing the narcotic prescription medication with alcohol causes depressive effects including breathing problems, organ malfunction, and cardiac arrest.     [x] Always keep your narcotic medication in a locked, secured location. Keep your medication private. This is to avoid individuals from taking your medication without your knowledge. This medication is highly sought after and locking your prescriptions will protect you from being a target of crime. [x] DO NOT stock pile your medication. This also will protect you from being a target of crime. [x] Dispose of any unused medications properly. Do not flush the medications. Look for appropriate waste collection programs in your community and drug take back events. [x] DO NOT drive while taking narcotic pain medication or muscle relaxers. REMINDER:  The phases following a total knee replacement that most patients often encounter. Briefly:    First phase: First phase is The Pain Phase. There is plenty of pain medication (and pharmacy can be consulted if needed). TKR is a painful operation however pain management has improved significantly. Second phase: The second phase is Not Sleeping Phase. It is common for total knee replacement patients not to sleep well after total knee replacement. This can go on for several months. Third phase: The third phase is Depression. The \"not sleeping at night phase\" leads to the third phase in which patients often experience depression/the blues, feeling that \"this will never get any better\". It will get better. Fourth phase: The fourth phase is Swelling and Warmth. Around 4-6 weeks, patients will start to compare their total knee replacement knee with their other knee.   They note that the total knee replacement knee is still a bit more swollen and a bit more warm compared to the other knee. This is normal.    Fifth phase: The fifth phase is \"my knee makes noises\" phase. It is common for patients to note that their total knee replacement makes noises.

## 2018-03-20 ENCOUNTER — OFFICE VISIT (OUTPATIENT)
Dept: ORTHOPEDIC SURGERY | Age: 63
End: 2018-03-20

## 2018-03-20 VITALS
SYSTOLIC BLOOD PRESSURE: 154 MMHG | BODY MASS INDEX: 37.37 KG/M2 | DIASTOLIC BLOOD PRESSURE: 95 MMHG | WEIGHT: 261 LBS | HEIGHT: 70 IN | HEART RATE: 74 BPM

## 2018-03-20 DIAGNOSIS — G89.29 CHRONIC PAIN OF LEFT KNEE: Primary | ICD-10-CM

## 2018-03-20 DIAGNOSIS — M25.562 CHRONIC PAIN OF LEFT KNEE: Primary | ICD-10-CM

## 2018-03-20 DIAGNOSIS — Z96.652 S/P TKR (TOTAL KNEE REPLACEMENT) USING CEMENT, LEFT: ICD-10-CM

## 2018-03-20 PROCEDURE — 99212 OFFICE O/P EST SF 10 MIN: CPT | Performed by: ORTHOPAEDIC SURGERY

## 2018-03-20 NOTE — PATIENT INSTRUCTIONS
total knee replacement knee is still a bit more swollen and a bit more warm compared to the other knee. This is normal.    Fifth phase: The fifth phase is \"my knee makes noises\" phase. It is common for patients to note that their total knee replacement makes noises.

## 2018-03-20 NOTE — LETTER
[x] Prophylactic antibiotics before routine dental cleaning are not required. [x] Other surgeries/procedures do require antibiotics (colonoscopy, abcesses/ active infections, etc.)    If you are NOT allergic to penicillin: 2 grams of amoxicillin, cephalexin or cephradine (orally) OR 2 grams of ampicillin or 1 gram of cefazolin (intra-muscularly or intravenously) 1 hour before the procedure. If you ARE allergic to penicillin[de-identified] 600 milligrams of clindamycin (orally or intravenously) 1 hour before the procedure. PLEASE NOTE: Antibiotics may vary based on clinical situation and culture results. PATIENT REMINDER:   Carry a list of your medications and allergies with you at all times. Call your pharmacy and our office at least 1 week in advance to refill prescriptions. Narcotic medications will not be refilled after hours or on weekends. ATTENTION    As of October 2, 2014, the [facility] ([address] [place] [address]) has mandated that ALL PRESCRIPTION PAIN MEDICINE cannot be called into your pharmacy. This includes:    Oxycodone (Percocet)  Hydrocodone (Vicodin, Norco)  Tramadol (Ultram)  Other    These medications must have prescriptions which are written and signed by your doctor (Dr. Brooklynn Rhodes). This means that you must call ahead and come in to the office to  the paper prescription and take it to your pharmacy. We are sorry for any inconvenience but this is now the law. Rajiv Arriaza MD  Board Certified Orthopaedic Surgeon  Knee and Shoulder Surgery Specialist    Contact Information:    [phone], Option 3         IMPORTANT NARCOTIC INFORMATION: PLEASE READ     [x] DO NOT share your prescription medication with anyone! Sharing is illegal.  The prescription dose is based on your age, weight, and health problems. Sharing your narcotic prescription can lead to accidental death of the individual for which the prescription was not prescribed.   You may their other knee. They note that the total knee replacement knee is still a bit more swollen and a bit more warm compared to the other knee. This is normal.    Fifth phase: The fifth phase is \"my knee makes noises\" phase. It is common for patients to note that their total knee replacement makes noises. Orders Placed This Encounter   Procedures    Amb External Referral To Orthopedic Surgery     Referral Priority:   Routine     Referral Type:   Surgical     Referred to Provider:   Francia Bosworth, MD     Requested Specialty:   Orthopedic Surgery     Number of Visits Requested:   1       Refills/New Prescriptions:  No orders of the defined types were placed in this encounter. Today's prescription medications will be e-scribed (when appropriate) to the Patient's Preferred Pharmacy:   [address] [phone]  [address] [place] [address]  Phone: [phone] Fax: [phone]         Katarzyna Gamez MD  Board Certified Orthopaedic Surgeon  Knee and Shoulder Surgery Specialist  Electronically signed by Katarzyna Gamez MD on 3/20/2018 at 11:48 AM     Contact Information:    [phone], Option 3    This dictation was performed with a verbal recognition program ([employer]) and it was checked for errors. It is possible that there are still dictated errors within this office note. If so, please bring any errors to my attention for an addendum. All efforts were made to ensure that this office note is accurate. I have personally performed and/or participated in the history, physical exam and medical decision making and reviewed all pertinent clinical information unless otherwise noted.

## 2018-03-20 NOTE — PROGRESS NOTES
Eris Silva MD  Orthopaedic Surgery & Sports Medicine  Knee & Shoulder Specialist      [x] [facility] OFFICE   [] [facility] OFFICE  [address]  [address] [facility]    [place], [address] [facility]          [place], [address]    [phone] Option 3   [phone] Option 3  [phone] (fax)    [phone] (fax)       PATIENT: Glenn Hoang    65 y.o.  male  YOB: 1955   MRN:  [id number]       Date of current encounter: 3/20/2018  This encounter is evaluated as a:       [] New Patient Visit    [x] Established Patient Visit   [] Post-Op Visit     [] Consult: requested by         [] Worker's Comp       Patient's PCP is Dr. Marcelina Tobar MD      SURGICAL FINDINGS: status post total left knee replacement using cement 11/8/17               Subjective:     Chief Complaint   Patient presents with    Knee Pain     ongoing evaluation and treatment of left knee       HPI:  s/p Left TKR Surgery on 11/8/2017. Glenn Hoang returns to the office today in follow-up of his left TKR. He is doing his physical therapy exercises. He states that his physical therapist is pleased with his progress with range of motion. He states he is walking without a limp.   He states that he does not have \"pain\" but he describes a soreness and tightness in his left knee which he ranks as a 8 out of 10 pain with activity and 5 out of 10 pain at rest.        PAIN ASSESSMENT:   Pain Assessment  Location of Pain: Knee  Location Modifiers: Left  Severity of Pain: 8  Quality of Pain: Dull  Duration of Pain: Persistent  Frequency of Pain: Constant  Date Pain First Started:  (from sx)  Aggravating Factors: Exercise, Kneeling, Squatting, Stairs  Limiting Behavior: Some  Relieving Factors: Rest  Result of Injury: No  Work-Related Injury: No  Are there other pain locations you wish to document?: No    Patient Active Problem List   Diagnosis    Status post total right knee replacement 1/24/2017    S/P left knee that appropriate follow-up care will be arranged. My official date for concluding my surgical practice was 12/31/2017. My official [facility] date is 3 months later, to follow my postoperative patients for 90 days, on 3/31/2018. [x]  I have referred him to Dr. Cynthia Alfaro. Return to Clinic/Follow - Up:  Louisa Ahn was asked to make a follow-up appointment:    [x] Follow up with Dr. Cynthia Alfaro in 3 months or sooner if needed    Louisa Ahn was instructed to call the office if his symptoms worsen or if new symptoms appear prior to the next scheduled visit. He is specifically instructed to contact the office between now & his scheduled appointment if he has concerns related to his condition. He is welcome to call for an appointment sooner if he has any additional concerns or questions. Patient Instructions     PATIENT INSTRUCTIONS  For Louisa Ahn  Office Visit with Dr Aida Chand on 3/20/2018    Activity & PT Instructions:   No sports until cleared by MD/PT   No driving if you are taking narcotic pain medications or muscle relaxers   Keep extremity elevated   PT or Home Exercise Program for 3 months post-op    Note: PT is usually twice a week for 3 months (will be extended if necessary)   \"Remember to: [employer] and [employer]" your knee as instructed by Dr Marcelino Ceja    Weight Bearing:   Full weightbearing as tolerated if cleared by MD/PT    Wound Instructions:   Use Vit E oil on incisions once a day   If in the sun, protect incisions from sun by using:     Sunscreen 30-50 SPF, reapply regularly. Elevate operative limb above heart level   Ice to knee for 15  20 minutes 3 x day    (\"ICE IS YOUR FRIEND\": try using a bag of frozen peas or corn)      Call the office ([phone], Option 3) if:     The incision becomes red, swollen or develops drainage. You develop a fever greater than 101 degrees.     Follow-up Care:   I have asked Louisa Ahn to schedule a follow-up appointment with Dr. Joann Mojica. He is specifically instructed to contact the office between now & his scheduled appointment if he has concerns related to his condition. He is welcome to call for an appointment sooner if he has any additional concerns or questions. Medication Instructions: for Suraj Little  Allergies: Penicillins; Bee venom; and Sulfa antibiotics     Any prescriptions must be used as directed. Narcotic prescriptions will be printed out and given to you in the office. Non-narcotic prescriptions will be sent to your pharmacy for pickup to be use as directed unless you request a printed prescription. If you have any concerns, questions or require refills, please contact our office ([phone], Option 3). If you experience any adverse reactions, stop the medication and call our office immediately. Antibiotics after Total Knee Replacements:     [x] Prophylactic antibiotics before routine dental cleaning are not required. [x] Other surgeries/procedures do require antibiotics (colonoscopy, abcesses/ active infections, etc.)    If you are NOT allergic to penicillin: 2 grams of amoxicillin, cephalexin or cephradine (orally) OR 2 grams of ampicillin or 1 gram of cefazolin (intra-muscularly or intravenously) 1 hour before the procedure. If you ARE allergic to penicillin[de-identified] 600 milligrams of clindamycin (orally or intravenously) 1 hour before the procedure. PLEASE NOTE: Antibiotics may vary based on clinical situation and culture results. PATIENT REMINDER:   Carry a list of your medications and allergies with you at all times. Call your pharmacy and our office at least 1 week in advance to refill prescriptions. Narcotic medications will not be refilled after hours or on weekends. ATTENTION    As of October 2, 2014, the [facility] ([address] [place] [address]) has mandated that ALL PRESCRIPTION PAIN MEDICINE cannot be called into your pharmacy.     This Shana Banerjee MD on 3/20/2018 at 11:48 AM     Contact Information:    [phone], Option 3    This dictation was performed with a verbal recognition program ([employer]AGON) and it was checked for errors. It is possible that there are still dictated errors within this office note. If so, please bring any errors to my attention for an addendum. All efforts were made to ensure that this office note is accurate. I have personally performed and/or participated in the history, physical exam and medical decision making and reviewed all pertinent clinical information unless otherwise noted.

## 2018-06-14 ENCOUNTER — OFFICE VISIT (OUTPATIENT)
Dept: ORTHOPEDIC SURGERY | Age: 63
End: 2018-06-14

## 2018-06-14 VITALS
BODY MASS INDEX: 37.37 KG/M2 | WEIGHT: 261.02 LBS | SYSTOLIC BLOOD PRESSURE: 140 MMHG | HEART RATE: 68 BPM | HEIGHT: 70 IN | DIASTOLIC BLOOD PRESSURE: 86 MMHG

## 2018-06-14 DIAGNOSIS — G89.29 CHRONIC PAIN OF LEFT KNEE: ICD-10-CM

## 2018-06-14 DIAGNOSIS — Z96.651 STATUS POST TOTAL RIGHT KNEE REPLACEMENT: ICD-10-CM

## 2018-06-14 DIAGNOSIS — M25.562 CHRONIC PAIN OF LEFT KNEE: ICD-10-CM

## 2018-06-14 DIAGNOSIS — M25.552 LEFT HIP PAIN: ICD-10-CM

## 2018-06-14 DIAGNOSIS — Z96.652 S/P TKR (TOTAL KNEE REPLACEMENT) USING CEMENT, LEFT: Primary | ICD-10-CM

## 2018-06-14 PROCEDURE — 99213 OFFICE O/P EST LOW 20 MIN: CPT | Performed by: ORTHOPAEDIC SURGERY

## 2018-06-14 RX ORDER — CLINDAMYCIN HYDROCHLORIDE 300 MG/1
CAPSULE ORAL
Qty: 6 CAPSULE | Refills: 0 | Status: SHIPPED | OUTPATIENT
Start: 2018-06-14

## 2018-06-22 ENCOUNTER — TELEPHONE (OUTPATIENT)
Dept: ORTHOPEDIC SURGERY | Age: 63
End: 2018-06-22

## 2019-05-30 ENCOUNTER — OFFICE VISIT (OUTPATIENT)
Dept: ORTHOPEDIC SURGERY | Age: 64
End: 2019-05-30
Payer: COMMERCIAL

## 2019-05-30 VITALS — WEIGHT: 256 LBS | HEIGHT: 70 IN | BODY MASS INDEX: 36.65 KG/M2

## 2019-05-30 DIAGNOSIS — Z96.652 HISTORY OF TOTAL KNEE REPLACEMENT, LEFT: Primary | ICD-10-CM

## 2019-05-30 PROCEDURE — 99213 OFFICE O/P EST LOW 20 MIN: CPT | Performed by: ORTHOPAEDIC SURGERY

## 2019-05-30 NOTE — PROGRESS NOTES
Chief Complaint    Knee Pain (lt knee continued pain hx of TKR by dr. Mcgowan November in 2017)      History of Present Illness:  Marlyn Gavin is a 62 y.o. male presents to the office today for follow-up of left knee pain. Patient was last seen 6/14/2018. He had synovasure performed on the left knee aspiration at that time which came back negative. Acute phase reactants were also negative from the [facility] system. Patient did have a left total knee arthroplasty done by Dr. Rosas Level 11/8/2017. In the immediate postoperative period he did well. No wound problems or complications postoperatively. He did well with his range of motion and at approximately 2 months range of motion was 0-117. Then over the past year and a half he started developing increasing pain, swelling, and decreased range of motion. He does have a past medical history on the left knee for an open meniscectomy performed 45 years ago. Most of his symptoms are concentrated on the medial and lateral aspects of the knee especially with stairs. However he does also have pain on the anterior aspect of the knee.      Pain Assessment  Location of Pain: Knee  Location Modifiers: Left  Severity of Pain: 7  Frequency of Pain: Intermittent  Aggravating Factors: Standing, Walking, Stairs  Limiting Behavior: Some  Result of Injury: No  Work-Related Injury: No  Are there other pain locations you wish to document?: No]    Medical History:  Past Medical History:   Diagnosis Date    Anesthesia     difficulty urinating after back surgery     Arthritis     Chronic back pain     relieved with surgery in 2015    Hypertension     Hypertension 11/8/2017    Pinched nerve     L2-L5    Retention of urine      Patient Active Problem List    Diagnosis Date Noted    S/P TKR (total knee replacement) using cement, left on 11/8/2017 11/08/2017    Hypertension 11/08/2017    S/P left knee arthroscopy 40 years ago 07/28/2017    Chronic pain of left knee 07/28/2017    Status post total right knee replacement 1/24/2017 07/03/2017     Past Surgical History:   Procedure Laterality Date    BACK SURGERY      COLONOSCOPY      JOINT REPLACEMENT      KNEE SURGERY  x 3    OTHER SURGICAL HISTORY Left 11/08/2017    LEFT TOTAL KNEE ARTHROPLASTY              SHOULDER SURGERY      TOTAL KNEE ARTHROPLASTY Right 01/24/2017    RIGHT TOTAL KNEE ARTHROPLASTY              Family History   Problem Relation Age of Onset    Diabetes Mother      Social History     Socioeconomic History    Marital status:      Spouse name: None    Number of children: None    Years of education: None    Highest education level: None   Occupational History    None   Social Needs    Financial resource strain: None    Food insecurity:     Worry: None     Inability: None    Transportation needs:     Medical: None     Non-medical: None   Tobacco Use    Smoking status: Former Smoker     Types: Cigars    Smokeless tobacco: Never Used   Substance and Sexual Activity    Alcohol use: Yes     Comment: monthly    Drug use: No     Comment: CIGAR    Sexual activity: None   Lifestyle    Physical activity:     Days per week: None     Minutes per session: None    Stress: None   Relationships    Social connections:     Talks on phone: None     Gets together: None     Attends Sikhism service: None     Active member of club or organization: None     Attends meetings of clubs or organizations: None     Relationship status: None    Intimate partner violence:     Fear of current or ex partner: None     Emotionally abused: None     Physically abused: None     Forced sexual activity: None   Other Topics Concern    None   Social History Narrative    None     Current Outpatient Medications   Medication Sig Dispense Refill    clindamycin (CLEOCIN) 300 MG capsule 2 NOW, 2 LATER THIS EVENING; 2 TOMORROW 6 capsule 0    naproxen (NAPROSYN) 500 MG tablet Take 1 tablet by mouth 2 times daily (with meals) Take one tab by mouth twice a day with food. 60 tablet 2    lisinopril (PRINIVIL;ZESTRIL) 30 MG tablet Take 30 mg by mouth daily      tamsulosin (FLOMAX) 0.4 MG capsule Take 0.4 mg by mouth daily 30 minutes after meal        No current facility-administered medications for this visit. Review of Systems:  Relevant review of systems reviewed and available in the patient's chart    Vital Signs: There were no vitals filed for this visit. General Exam:   Constitutional: Patient is adequately groomed with no evidence of malnutrition  DTRs: Deep tendon reflexes are intact  Mental Status: The patient is oriented to time, place and person. The patient's mood and affect are appropriate. Lymphatic: The lymphatic examination bilaterally reveals all areas to be without enlargement or induration. Vascular: Examination reveals no swelling or calf tenderness. Peripheral pulses are palpable and 2+. Neurological: The patient has good coordination. There is no weakness or sensory deficit. Body mass index is 36.73 kg/m². Left Knee Examination:    Inspection:  No erythema or signs of infection. Prior surgical incisions are well healed     Palpation:  There is tenderness to palpation along the medial and lateral joint line. Range of Motion:  0 extension to 95 of active flexion. Strength:  4+/5 quadriceps and hamstrings    Special Tests: The knee is stable to varus valgus stressing/anterior posterior drawer. Negative Homans test.  he does have more motion with varus and valgus stress testing in mid range flexion when compared to fully extended                                  Skin: There are no rashes, ulcerations or lesions. Gait: mildly antalgic favoring the right side    Reflex 2+ patellar    Radiology:   X-rays obtained and reviewed in office:  Views 3 views including AP weightbearing, lateral, and skyline  Location  left knee:    Impression:   Left total knee arthroplasty in place.   No evidence of loosening or

## 2019-06-14 ENCOUNTER — HOSPITAL ENCOUNTER (OUTPATIENT)
Dept: NUCLEAR MEDICINE | Age: 64
Discharge: HOME | End: 2019-06-14
Payer: COMMERCIAL

## 2019-06-14 DIAGNOSIS — Z96.652 HISTORY OF TOTAL KNEE REPLACEMENT, LEFT: ICD-10-CM

## 2019-06-14 PROCEDURE — 3430000000 HC RX DIAGNOSTIC RADIOPHARMACEUTICAL: Performed by: ORTHOPAEDIC SURGERY

## 2019-06-14 PROCEDURE — 78315 BONE IMAGING 3 PHASE: CPT

## 2019-06-14 PROCEDURE — A9503 TC99M MEDRONATE: HCPCS | Performed by: ORTHOPAEDIC SURGERY

## 2019-06-14 RX ORDER — TC 99M MEDRONATE 20 MG/10ML
25.1 INJECTION, POWDER, LYOPHILIZED, FOR SOLUTION INTRAVENOUS
Status: COMPLETED | OUTPATIENT
Start: 2019-06-14 | End: 2019-06-14

## 2019-06-14 RX ADMIN — TC 99M MEDRONATE 25.1 MILLICURIE: 20 INJECTION, POWDER, LYOPHILIZED, FOR SOLUTION INTRAVENOUS at 08:05

## 2019-06-24 ENCOUNTER — TELEPHONE (OUTPATIENT)
Dept: ORTHOPEDIC SURGERY | Age: 64
End: 2019-06-24

## 2019-08-12 LAB
BASOPHILS # BLD: 0.6 %
BASOPHILS ABSOLUTE: 0 X10(3)/MCL (ref 0–0.1)
C-REACTIVE PROTEIN WIDE RANGE: 1.1 MG/L
EOSINOPHIL # BLD: 2.5 %
EOSINOPHILS ABSOLUTE: 0.1 X10(3)/MCL (ref 0–0.5)
HCT VFR BLD CALC: 39.8 % (ref 40–51)
HEMOGLOBIN: 13.3 G/DL (ref 13.7–17.5)
IMMATURE CELLS ABSOLUTE COUNT: 0 X10(3)/MCL (ref 0–0.1)
IMMATURE GRANULOCYTES: 0.4 %
LYMPHOCYTES # BLD: 27.7 %
LYMPHOCYTES ABSOLUTE: 1.3 X10(3)/MCL (ref 1.2–3.9)
MCH RBC QN AUTO: 30.2 PG (ref 26–34)
MCHC RBC AUTO-ENTMCNC: 33.4 G/DL (ref 30.7–35.5)
MCV RBC AUTO: 90.5 FL (ref 80–100)
MONOCYTES # BLD: 9.6 %
MONOCYTES ABSOLUTE: 0.5 X10(3)/MCL (ref 0.3–0.9)
NEUTROPHILS ABSOLUTE: 2.9 X10(3)/MCL (ref 1.6–6.1)
NEUTROPHILS: 59.2 %
PDW BLD-RTO: 12.4 %
PLATELET # BLD: 200 X10(3)/MCL (ref 155–369)
PMV BLD AUTO: 11.4 FL (ref 8.8–12.5)
RBC # BLD: 4.4 X10(6)/MCL (ref 4.6–6.1)
SEDIMENTATION RATE, ERYTHROCYTE: 6 MM/HR (ref 0–20)
WBC # BLD: 4.8 X10(3)/MCL (ref 3.7–10.3)

## 2019-08-26 ENCOUNTER — TELEPHONE (OUTPATIENT)
Dept: ORTHOPEDIC SURGERY | Age: 64
End: 2019-08-26

## 2019-08-28 NOTE — TELEPHONE ENCOUNTER
Spoke to Mr Fortino Lopez and let him know the type of knee Dr Matt Hogan has used in his total knees.

## 2019-09-12 ENCOUNTER — OFFICE VISIT (OUTPATIENT)
Dept: ORTHOPEDIC SURGERY | Age: 64
End: 2019-09-12
Payer: COMMERCIAL

## 2019-09-12 DIAGNOSIS — Z96.652 PAIN DUE TO TOTAL LEFT KNEE REPLACEMENT, INITIAL ENCOUNTER (HCC): Primary | ICD-10-CM

## 2019-09-12 DIAGNOSIS — T84.84XA PAIN DUE TO TOTAL LEFT KNEE REPLACEMENT, INITIAL ENCOUNTER (HCC): Primary | ICD-10-CM

## 2019-09-12 PROCEDURE — 99213 OFFICE O/P EST LOW 20 MIN: CPT | Performed by: ORTHOPAEDIC SURGERY

## 2019-09-12 NOTE — PROGRESS NOTES
plan.  We will have him get started in physical therapy and follow-up in 3 months and see how he is doing. If this does not work it is always possible to do the revision surgery later.

## 2019-10-03 ENCOUNTER — HOSPITAL ENCOUNTER (OUTPATIENT)
Dept: PHYSICAL THERAPY | Age: 64
Setting detail: THERAPIES SERIES
Discharge: HOME | End: 2019-10-03
Payer: COMMERCIAL

## 2019-10-03 PROCEDURE — 97140 MANUAL THERAPY 1/> REGIONS: CPT | Performed by: PHYSICAL THERAPIST

## 2019-10-03 PROCEDURE — 97161 PT EVAL LOW COMPLEX 20 MIN: CPT | Performed by: PHYSICAL THERAPIST

## 2019-10-03 PROCEDURE — 97110 THERAPEUTIC EXERCISES: CPT | Performed by: PHYSICAL THERAPIST

## 2019-10-07 ENCOUNTER — HOSPITAL ENCOUNTER (OUTPATIENT)
Dept: PHYSICAL THERAPY | Age: 64
Setting detail: THERAPIES SERIES
Discharge: HOME | End: 2019-10-07
Payer: COMMERCIAL

## 2019-10-07 PROCEDURE — 97140 MANUAL THERAPY 1/> REGIONS: CPT | Performed by: PHYSICAL THERAPIST

## 2019-10-07 PROCEDURE — 97110 THERAPEUTIC EXERCISES: CPT | Performed by: PHYSICAL THERAPIST

## 2019-10-21 ENCOUNTER — HOSPITAL ENCOUNTER (OUTPATIENT)
Dept: PHYSICAL THERAPY | Age: 64
Setting detail: THERAPIES SERIES
Discharge: HOME | End: 2019-10-21
Payer: COMMERCIAL

## 2019-10-21 PROCEDURE — 97110 THERAPEUTIC EXERCISES: CPT | Performed by: PHYSICAL THERAPIST

## 2019-10-21 PROCEDURE — 97140 MANUAL THERAPY 1/> REGIONS: CPT | Performed by: PHYSICAL THERAPIST

## 2019-10-30 ENCOUNTER — HOSPITAL ENCOUNTER (OUTPATIENT)
Dept: PHYSICAL THERAPY | Age: 64
Setting detail: THERAPIES SERIES
Discharge: HOME | End: 2019-10-30
Payer: COMMERCIAL

## 2019-10-30 PROCEDURE — 97110 THERAPEUTIC EXERCISES: CPT | Performed by: PHYSICAL THERAPIST

## 2019-10-30 PROCEDURE — 97140 MANUAL THERAPY 1/> REGIONS: CPT | Performed by: PHYSICAL THERAPIST

## 2019-11-12 ENCOUNTER — HOSPITAL ENCOUNTER (OUTPATIENT)
Dept: PHYSICAL THERAPY | Age: 64
Setting detail: THERAPIES SERIES
Discharge: HOME | End: 2019-11-12
Payer: COMMERCIAL

## 2019-11-12 PROCEDURE — 97140 MANUAL THERAPY 1/> REGIONS: CPT | Performed by: PHYSICAL THERAPIST

## 2019-11-12 PROCEDURE — 97110 THERAPEUTIC EXERCISES: CPT | Performed by: PHYSICAL THERAPIST

## 2019-11-21 ENCOUNTER — HOSPITAL ENCOUNTER (OUTPATIENT)
Dept: PHYSICAL THERAPY | Age: 64
Setting detail: THERAPIES SERIES
Discharge: HOME | End: 2019-11-21
Payer: COMMERCIAL

## 2019-11-21 PROCEDURE — 97140 MANUAL THERAPY 1/> REGIONS: CPT | Performed by: PHYSICAL THERAPIST

## 2019-11-21 PROCEDURE — 97110 THERAPEUTIC EXERCISES: CPT | Performed by: PHYSICAL THERAPIST

## 2022-01-26 ENCOUNTER — HOSPITAL ENCOUNTER (OUTPATIENT)
Dept: NUCLEAR MEDICINE | Age: 67
Discharge: HOME | End: 2022-01-26
Payer: COMMERCIAL

## 2022-01-26 DIAGNOSIS — Z96.652 HISTORY OF TOTAL LEFT KNEE REPLACEMENT: ICD-10-CM

## 2022-01-26 PROCEDURE — 3430000000 HC RX DIAGNOSTIC RADIOPHARMACEUTICAL: Performed by: ORTHOPAEDIC SURGERY

## 2022-01-26 PROCEDURE — A9503 TC99M MEDRONATE: HCPCS | Performed by: ORTHOPAEDIC SURGERY

## 2022-01-26 PROCEDURE — 78315 BONE IMAGING 3 PHASE: CPT

## 2022-01-26 RX ORDER — TC 99M MEDRONATE 20 MG/10ML
26.1 INJECTION, POWDER, LYOPHILIZED, FOR SOLUTION INTRAVENOUS ONCE
Status: COMPLETED | OUTPATIENT
Start: 2022-01-26 | End: 2022-01-26

## 2022-01-26 RX ADMIN — TC 99M MEDRONATE 26.1 MILLICURIE: 20 INJECTION, POWDER, LYOPHILIZED, FOR SOLUTION INTRAVENOUS at 08:55

## 2025-04-09 ENCOUNTER — TRANSCRIBE ORDERS (OUTPATIENT)
Dept: ADMINISTRATIVE | Age: 70
End: 2025-04-09

## 2025-04-09 DIAGNOSIS — Z96.652 HISTORY OF REVISION OF TOTAL REPLACEMENT OF LEFT KNEE JOINT: Primary | ICD-10-CM

## 2025-04-09 DIAGNOSIS — T84.84XA PAINFUL ORTHOPAEDIC HARDWARE: ICD-10-CM

## 2025-04-24 ENCOUNTER — HOSPITAL ENCOUNTER (OUTPATIENT)
Dept: NUCLEAR MEDICINE | Age: 70
Discharge: HOME | End: 2025-04-24
Payer: COMMERCIAL

## 2025-04-24 ENCOUNTER — HOSPITAL ENCOUNTER (OUTPATIENT)
Age: 70
Discharge: HOME | End: 2025-04-24
Payer: COMMERCIAL

## 2025-04-24 DIAGNOSIS — Z96.652 HISTORY OF REVISION OF TOTAL REPLACEMENT OF LEFT KNEE JOINT: ICD-10-CM

## 2025-04-24 DIAGNOSIS — T84.84XA PAINFUL ORTHOPAEDIC HARDWARE: ICD-10-CM

## 2025-04-24 LAB
BASOPHILS # BLD: 0 K/UL (ref 0–0.2)
BASOPHILS NFR BLD: 0.7 %
CRP SERPL-MCNC: <3 MG/L (ref 0–5.1)
DEPRECATED RDW RBC AUTO: 13.1 % (ref 12.4–15.4)
EOSINOPHIL # BLD: 0.1 K/UL (ref 0–0.6)
EOSINOPHIL NFR BLD: 2.1 %
ERYTHROCYTE [SEDIMENTATION RATE] IN BLOOD BY WESTERGREN METHOD: 7 MM/HR (ref 0–20)
HCT VFR BLD AUTO: 42.3 % (ref 40.5–52.5)
HGB BLD-MCNC: 14.4 G/DL (ref 13.5–17.5)
LYMPHOCYTES # BLD: 1.5 K/UL (ref 1–5.1)
LYMPHOCYTES NFR BLD: 23.4 %
MCH RBC QN AUTO: 30.4 PG (ref 26–34)
MCHC RBC AUTO-ENTMCNC: 34 G/DL (ref 31–36)
MCV RBC AUTO: 89.3 FL (ref 80–100)
MONOCYTES # BLD: 0.6 K/UL (ref 0–1.3)
MONOCYTES NFR BLD: 9.8 %
NEUTROPHILS # BLD: 4.1 K/UL (ref 1.7–7.7)
NEUTROPHILS NFR BLD: 64 %
PLATELET # BLD AUTO: 205 K/UL (ref 135–450)
PMV BLD AUTO: 9 FL (ref 5–10.5)
RBC # BLD AUTO: 4.73 M/UL (ref 4.2–5.9)
WBC # BLD AUTO: 6.4 K/UL (ref 4–11)

## 2025-04-24 PROCEDURE — 3430000000 HC RX DIAGNOSTIC RADIOPHARMACEUTICAL: Performed by: PHYSICIAN ASSISTANT

## 2025-04-24 PROCEDURE — 85652 RBC SED RATE AUTOMATED: CPT

## 2025-04-24 PROCEDURE — A9503 TC99M MEDRONATE: HCPCS | Performed by: PHYSICIAN ASSISTANT

## 2025-04-24 PROCEDURE — 78315 BONE IMAGING 3 PHASE: CPT

## 2025-04-24 PROCEDURE — 86140 C-REACTIVE PROTEIN: CPT

## 2025-04-24 PROCEDURE — 85025 COMPLETE CBC W/AUTO DIFF WBC: CPT

## 2025-04-24 PROCEDURE — 36415 COLL VENOUS BLD VENIPUNCTURE: CPT

## 2025-04-24 RX ORDER — TC 99M MEDRONATE 20 MG/10ML
26 INJECTION, POWDER, LYOPHILIZED, FOR SOLUTION INTRAVENOUS
Status: COMPLETED | OUTPATIENT
Start: 2025-04-24 | End: 2025-04-24

## 2025-04-24 RX ADMIN — TC 99M MEDRONATE 26 MILLICURIE: 20 INJECTION, POWDER, LYOPHILIZED, FOR SOLUTION INTRAVENOUS at 09:06
